# Patient Record
Sex: FEMALE | Race: WHITE | NOT HISPANIC OR LATINO | URBAN - METROPOLITAN AREA
[De-identification: names, ages, dates, MRNs, and addresses within clinical notes are randomized per-mention and may not be internally consistent; named-entity substitution may affect disease eponyms.]

---

## 2017-09-01 ENCOUNTER — EMERGENCY (EMERGENCY)
Facility: HOSPITAL | Age: 67
LOS: 0 days | Discharge: HOME | End: 2017-09-01
Attending: EMERGENCY MEDICINE | Admitting: EMERGENCY MEDICINE
Payer: MEDICARE

## 2017-09-01 VITALS
DIASTOLIC BLOOD PRESSURE: 69 MMHG | HEART RATE: 64 BPM | SYSTOLIC BLOOD PRESSURE: 179 MMHG | OXYGEN SATURATION: 100 % | RESPIRATION RATE: 17 BRPM | TEMPERATURE: 98 F

## 2017-09-01 VITALS
HEIGHT: 64 IN | WEIGHT: 104.06 LBS | TEMPERATURE: 98 F | OXYGEN SATURATION: 100 % | HEART RATE: 64 BPM | DIASTOLIC BLOOD PRESSURE: 84 MMHG | SYSTOLIC BLOOD PRESSURE: 180 MMHG | RESPIRATION RATE: 16 BRPM

## 2017-09-01 DIAGNOSIS — H53.8 OTHER VISUAL DISTURBANCES: ICD-10-CM

## 2017-09-01 DIAGNOSIS — Z86.73 PERSONAL HISTORY OF TRANSIENT ISCHEMIC ATTACK (TIA), AND CEREBRAL INFARCTION WITHOUT RESIDUAL DEFICITS: ICD-10-CM

## 2017-09-01 DIAGNOSIS — G35 MULTIPLE SCLEROSIS: ICD-10-CM

## 2017-09-01 LAB
ALBUMIN SERPL ELPH-MCNC: 3.3 G/DL — SIGNIFICANT CHANGE UP (ref 3.3–5)
ALP SERPL-CCNC: 86 U/L — SIGNIFICANT CHANGE UP (ref 40–120)
ALT FLD-CCNC: 42 U/L — SIGNIFICANT CHANGE UP (ref 12–78)
ANION GAP SERPL CALC-SCNC: 6 MMOL/L — SIGNIFICANT CHANGE UP (ref 5–17)
AST SERPL-CCNC: 20 U/L — SIGNIFICANT CHANGE UP (ref 15–37)
BASOPHILS # BLD AUTO: 0.1 K/UL — SIGNIFICANT CHANGE UP (ref 0–0.2)
BASOPHILS NFR BLD AUTO: 1.3 % — SIGNIFICANT CHANGE UP (ref 0–2)
BILIRUB SERPL-MCNC: 0.4 MG/DL — SIGNIFICANT CHANGE UP (ref 0.2–1.2)
BUN SERPL-MCNC: 29 MG/DL — HIGH (ref 7–23)
CALCIUM SERPL-MCNC: 9.1 MG/DL — SIGNIFICANT CHANGE UP (ref 8.5–10.1)
CHLORIDE SERPL-SCNC: 108 MMOL/L — SIGNIFICANT CHANGE UP (ref 96–108)
CO2 SERPL-SCNC: 29 MMOL/L — SIGNIFICANT CHANGE UP (ref 22–31)
CREAT SERPL-MCNC: 0.94 MG/DL — SIGNIFICANT CHANGE UP (ref 0.5–1.3)
EOSINOPHIL # BLD AUTO: 0.2 K/UL — SIGNIFICANT CHANGE UP (ref 0–0.5)
EOSINOPHIL NFR BLD AUTO: 2.3 % — SIGNIFICANT CHANGE UP (ref 0–6)
ERYTHROCYTE [SEDIMENTATION RATE] IN BLOOD: 13 MM/HR — SIGNIFICANT CHANGE UP (ref 0–20)
GLUCOSE SERPL-MCNC: 133 MG/DL — HIGH (ref 70–99)
HCT VFR BLD CALC: 37.1 % — SIGNIFICANT CHANGE UP (ref 34.5–45)
HGB BLD-MCNC: 12.7 G/DL — SIGNIFICANT CHANGE UP (ref 11.5–15.5)
LYMPHOCYTES # BLD AUTO: 1.4 K/UL — SIGNIFICANT CHANGE UP (ref 1–3.3)
LYMPHOCYTES # BLD AUTO: 20.9 % — SIGNIFICANT CHANGE UP (ref 13–44)
MCHC RBC-ENTMCNC: 31.1 PG — SIGNIFICANT CHANGE UP (ref 27–34)
MCHC RBC-ENTMCNC: 34.2 GM/DL — SIGNIFICANT CHANGE UP (ref 32–36)
MCV RBC AUTO: 90.8 FL — SIGNIFICANT CHANGE UP (ref 80–100)
MONOCYTES # BLD AUTO: 0.5 K/UL — SIGNIFICANT CHANGE UP (ref 0–0.9)
MONOCYTES NFR BLD AUTO: 7.5 % — SIGNIFICANT CHANGE UP (ref 2–14)
NEUTROPHILS # BLD AUTO: 4.7 K/UL — SIGNIFICANT CHANGE UP (ref 1.8–7.4)
NEUTROPHILS NFR BLD AUTO: 68 % — SIGNIFICANT CHANGE UP (ref 43–77)
PLATELET # BLD AUTO: 152 K/UL — SIGNIFICANT CHANGE UP (ref 150–400)
POTASSIUM SERPL-MCNC: 4.4 MMOL/L — SIGNIFICANT CHANGE UP (ref 3.5–5.3)
POTASSIUM SERPL-SCNC: 4.4 MMOL/L — SIGNIFICANT CHANGE UP (ref 3.5–5.3)
PROT SERPL-MCNC: 6.3 GM/DL — SIGNIFICANT CHANGE UP (ref 6–8.3)
RBC # BLD: 4.09 M/UL — SIGNIFICANT CHANGE UP (ref 3.8–5.2)
RBC # FLD: 11.5 % — SIGNIFICANT CHANGE UP (ref 10.3–14.5)
SODIUM SERPL-SCNC: 143 MMOL/L — SIGNIFICANT CHANGE UP (ref 135–145)
WBC # BLD: 6.9 K/UL — SIGNIFICANT CHANGE UP (ref 3.8–10.5)
WBC # FLD AUTO: 6.9 K/UL — SIGNIFICANT CHANGE UP (ref 3.8–10.5)

## 2017-09-01 PROCEDURE — 99285 EMERGENCY DEPT VISIT HI MDM: CPT

## 2017-09-01 PROCEDURE — 70450 CT HEAD/BRAIN W/O DYE: CPT | Mod: 26

## 2017-09-01 PROCEDURE — 93010 ELECTROCARDIOGRAM REPORT: CPT

## 2017-09-01 NOTE — ED PROVIDER NOTE - MEDICAL DECISION MAKING DETAILS
Pt with hx MS and stroke p/w flashing lights in visual field for a few minutes that have resolved, no other sx, stroke scale 0. CTH shows no acute findings. Pt understands need for otpt f/u and rted precautions

## 2017-09-01 NOTE — ED PROVIDER NOTE - PROGRESS NOTE DETAILS
Attending Mary: Bedside US of bilateral eyes showed no evidence of retinal detachment or vitreous hemorrhage. No pain with ocular motion. Attending Mary: 2 attempts made to call on-call neurology, no answer. Spoke with pt who would prefer to f/u with her neuro-ophthalmologist and MS specialist in the next few days. Strict RTED precautions were given NIHSS: 0.

## 2017-09-01 NOTE — ED PROVIDER NOTE - OBJECTIVE STATEMENT
65 y/o F hx MS and stroke p/w vision changes. Pt reports seeing flashing lights in bilat visual field that lasted a few seconds, approx 10 episodes over a few minutes today. Denies any pain, F/C/S/N/V/D. Sx have completely resolved. 67 y/o F hx MS and stroke p/w vision changes. Pt reports seeing flashing lights in bilat visual field that lasted a few seconds, approx 10 episodes over a few minutes today. Denies any pain, F/C/S/N/V/D. Sx have completely resolved. Denies other sx of weakness, numbness/tingling.

## 2017-09-01 NOTE — ED ADULT NURSE NOTE - OBJECTIVE STATEMENT
Pt alert and oriented x3. Pt presents s/p having an episode of flashing light in her vision. All symptoms have resolved. Pt denies HA loss of vision or weakness. Pt hx of stroke in 2014.

## 2017-09-01 NOTE — ED ADULT TRIAGE NOTE - CHIEF COMPLAINT QUOTE
Patient reports seeing flashing lights while watching tv, hx of MS and stroke. Speech is clear at this time patient is AAOx4, denies pain.

## 2019-05-18 ENCOUNTER — EMERGENCY (EMERGENCY)
Facility: HOSPITAL | Age: 69
LOS: 0 days | Discharge: HOME | End: 2019-05-18
Attending: EMERGENCY MEDICINE | Admitting: EMERGENCY MEDICINE
Payer: MEDICARE

## 2019-05-18 VITALS
WEIGHT: 102.96 LBS | HEIGHT: 61 IN | RESPIRATION RATE: 16 BRPM | HEART RATE: 70 BPM | TEMPERATURE: 98 F | SYSTOLIC BLOOD PRESSURE: 157 MMHG | OXYGEN SATURATION: 100 % | DIASTOLIC BLOOD PRESSURE: 75 MMHG

## 2019-05-18 DIAGNOSIS — M54.9 DORSALGIA, UNSPECIFIED: ICD-10-CM

## 2019-05-18 DIAGNOSIS — Z88.2 ALLERGY STATUS TO SULFONAMIDES: ICD-10-CM

## 2019-05-18 DIAGNOSIS — Z88.0 ALLERGY STATUS TO PENICILLIN: ICD-10-CM

## 2019-05-18 DIAGNOSIS — M54.31 SCIATICA, RIGHT SIDE: ICD-10-CM

## 2019-05-18 PROBLEM — I63.9 CEREBRAL INFARCTION, UNSPECIFIED: Chronic | Status: ACTIVE | Noted: 2017-09-01

## 2019-05-18 PROCEDURE — 99283 EMERGENCY DEPT VISIT LOW MDM: CPT

## 2019-05-18 RX ORDER — LIDOCAINE 4 G/100G
1 CREAM TOPICAL ONCE
Refills: 0 | Status: COMPLETED | OUTPATIENT
Start: 2019-05-18 | End: 2019-05-18

## 2019-05-18 RX ORDER — IBUPROFEN 200 MG
1 TABLET ORAL
Qty: 12 | Refills: 0
Start: 2019-05-18

## 2019-05-18 RX ORDER — CYCLOBENZAPRINE HYDROCHLORIDE 10 MG/1
1 TABLET, FILM COATED ORAL
Qty: 10 | Refills: 0
Start: 2019-05-18 | End: 2019-05-22

## 2019-05-18 RX ORDER — CYCLOBENZAPRINE HYDROCHLORIDE 10 MG/1
10 TABLET, FILM COATED ORAL ONCE
Refills: 0 | Status: COMPLETED | OUTPATIENT
Start: 2019-05-18 | End: 2019-05-18

## 2019-05-18 RX ORDER — IBUPROFEN 200 MG
400 TABLET ORAL ONCE
Refills: 0 | Status: COMPLETED | OUTPATIENT
Start: 2019-05-18 | End: 2019-05-18

## 2019-05-18 RX ADMIN — LIDOCAINE 1 PATCH: 4 CREAM TOPICAL at 08:44

## 2019-05-18 RX ADMIN — Medication 400 MILLIGRAM(S): at 08:44

## 2019-05-18 RX ADMIN — CYCLOBENZAPRINE HYDROCHLORIDE 10 MILLIGRAM(S): 10 TABLET, FILM COATED ORAL at 08:44

## 2019-05-18 NOTE — ED ADULT NURSE NOTE - OBJECTIVE STATEMENT
Pt presents to ED after lower back pain radiating to right buttocks and down right leg.  Pt states this began 2 days prior.  Denies injury or falls but states "Jessica just been superwomen lately".  Denies taking any medication PTA.  No neuro deficits and pr able to move b/l LE.  Denies numbness and tingling.

## 2019-05-18 NOTE — ED PROVIDER NOTE - OBJECTIVE STATEMENT
67 yo f with MS presents with left sided right gluteal pain that radiates down into her right leg. no bowel or bladder incontinence, no tingling around rectum. she states she was "doing too much" to get ready for her trip to [place] next month.  no falls or injury.

## 2019-05-18 NOTE — ED ADULT NURSE NOTE - NSIMPLEMENTINTERV_GEN_ALL_ED
Implemented All Universal Safety Interventions:  [place] to call system. Call bell, personal items and telephone within reach. Instruct patient to call for assistance. Room bathroom lighting operational. Non-slip footwear when patient is off stretcher. Physically safe environment: no spills, clutter or unnecessary equipment. Stretcher in lowest position, wheels locked, appropriate side rails in place.

## 2019-05-18 NOTE — ED ADULT TRIAGE NOTE - CHIEF COMPLAINT QUOTE
Non traumatic back pain radiating down right leg. States same this happened once before and resolved. Did not attempt to take any pain meds PTA. Denies any pain at triage, VS stable.

## 2019-05-18 NOTE — ED PROVIDER NOTE - NSFOLLOWUPCLINICS_GEN_ALL_ED_FT
[facility]  Family Medicine  [address] [place] [address]  Phone: [phone]  Fax:   Follow Up Time:

## 2019-11-08 PROBLEM — G35 MULTIPLE SCLEROSIS: Chronic | Status: ACTIVE | Noted: 2019-05-18

## 2019-12-17 ENCOUNTER — APPOINTMENT (OUTPATIENT)
Dept: NEUROLOGY | Facility: CLINIC | Age: 69
End: 2019-12-17

## 2020-02-14 ENCOUNTER — APPOINTMENT (OUTPATIENT)
Dept: NEUROLOGY | Facility: CLINIC | Age: 70
End: 2020-02-14
Payer: MEDICARE

## 2020-02-14 VITALS
SYSTOLIC BLOOD PRESSURE: 138 MMHG | DIASTOLIC BLOOD PRESSURE: 83 MMHG | HEIGHT: 61 IN | HEART RATE: 80 BPM | BODY MASS INDEX: 19.45 KG/M2 | WEIGHT: 103 LBS

## 2020-02-14 PROCEDURE — 99204 OFFICE O/P NEW MOD 45 MIN: CPT

## 2020-02-14 RX ORDER — ATORVASTATIN CALCIUM 20 MG/1
20 TABLET, FILM COATED ORAL
Refills: 0 | Status: ACTIVE | COMMUNITY

## 2020-02-14 RX ORDER — LOSARTAN POTASSIUM 25 MG/1
25 TABLET, FILM COATED ORAL
Refills: 0 | Status: ACTIVE | COMMUNITY

## 2020-02-14 NOTE — HISTORY OF PRESENT ILLNESS
[FreeTextEntry1] : 69-year-old right-handed female with history remarkable for multiple sclerosis, is currently on Avonex weekly IM injections, presents today for continuation of care as her neurologist Dr. Sharpe has moved.\par \par Patient reports that she was a MS suspect around age 26, her symptoms were soft and occurred off and on, in her mid 40s, she was diagnosed with MS by Dr. Sharpe, (MRI positive), was started on Avonex, she stayed on the injections for several years, about 5 years ago she was switched to Tecfidera, she took it for less than a year, it was discontinued as her blood work was abnormal, patient was restarted on Avonex, she got these injections well, she has no headaches, visual blurring, muscle aches, flulike symptoms prior depression. \par \par Patient reports that she is able to walk unassisted, uses a cane as needed, her balance is slightly off, she denies double vision, visual blurring, tinnitus, speech disturbance, she admits to urinary overflow incontinence has started using diapers, she also admits to having short-term memory problems, she has been on donepezil 5 mg daily is tolerating it well.\par \par Patient does not recall when she had last MRI, and records from Dr. Sharpe for over last 5 years are not available.

## 2020-02-14 NOTE — PHYSICAL EXAM
[General Appearance - Alert] : alert [General Appearance - In No Acute Distress] : in no acute distress [Oriented To Time, Place, And Person] : oriented to person, place, and time [Mood] : the mood was normal [Person] : oriented to person [Place] : oriented to place [Time] : oriented to time [Short Term Intact] : short term memory impaired [Concentration Intact] : a decrease in concentrating ability was observed [Repeating Phrases] : no difficulty repeating a phrase [Naming Objects] : no difficulty naming common objects [Visual Intact] : visual attention was ~T not ~L decreased [Comprehension] : comprehension intact [Writing A Sentence] : no difficulty writing a sentence [Fluency] : fluency intact [Reading] : reading intact [Past History] : adequate knowledge of personal past history [Cranial Nerves Optic (II)] : visual acuity intact bilaterally,  visual fields full to confrontation, pupils equal round and reactive to light [Cranial Nerves Oculomotor (III)] : extraocular motion intact [Cranial Nerves Trigeminal (V)] : facial sensation intact symmetrically [Cranial Nerves Facial (VII)] : face symmetrical [Cranial Nerves Glossopharyngeal (IX)] : tongue and palate midline [Cranial Nerves Hypoglossal (XII)] : there was no tongue deviation with protrusion [Cranial Nerves Accessory (XI - Cranial And Spinal)] : head turning and shoulder shrug symmetric [Whisper Not [place]] : whispered voice was not heard [Motor Tone] : muscle tone was normal in all four extremities [Sensation Tactile Decrease] : light touch was intact [Proprioception] : proprioception was intact [Romberg's Sign] : Romberg's sign was negtive [Vibration Decrease Leg / Foot Toes Both Feet] : decreased at the toes of both feet  [Vibration Decrease Leg / Foot Both Ankles] : decreased at both ankles [Limited Balance] : the patient's balance was impaired [Past-pointing] : there was no past-pointing [Tremor] : no tremor present [Dysdiadochokinesia Bilaterally] : not present [Coordination - Dysmetria Impaired Finger-to-Nose Left Only] : present on the left side [2+] : Ankle jerk left 2+ [Plantar Reflex Right Only] : normal on the right [Plantar Reflex Left Only] : normal on the left [FreeTextEntry6] : No pronator drift, mild weakness in the left knee extensors, other muscle groups 5/5.  [PERRL With Normal Accommodation] : pupils were equal in size, round, reactive to light, with normal accommodation [FreeTextEntry8] : Coordination: Mildly ataxic gait [Extraocular Movements] : extraocular movements were intact [No APD] : no afferent pupillary defect [Full Visual Field] : full visual field [Neck Cervical Mass (___cm)] : no neck mass was observed [Auscultation Breath Sounds / Voice Sounds] : lungs were clear to auscultation bilaterally [Heart Rate And Rhythm] : heart rate was normal and rhythm regular [Heart Sounds] : normal S1 and S2 [Arterial Pulses Carotid] : carotid pulses were normal with no bruits [Edema] : there was no peripheral edema [Involuntary Movements] : no involuntary movements were seen [] : no rash

## 2020-02-14 NOTE — REVIEW OF SYSTEMS
[Feeling Tired] : feeling tired [Poor Coordination] : poor coordination [Memory Lapses or Loss] : memory loss [Decr. Concentrating Ability] : decreased concentrating ability [Difficulties in Speech] : speech difficulties [Numbness] : numbness [Tingling] : tingling [Anxiety] : anxiety [Incontinence] : incontinence [Negative] : Endocrine

## 2020-02-14 NOTE — REASON FOR VISIT
[FreeTextEntry1] : Continuation of care for multiple sclerosis [Initial Eval - Existing Diagnosis] : an initial evaluation of an existing diagnosis

## 2020-02-14 NOTE — DISCUSSION/SUMMARY
[FreeTextEntry1] : 69-year-old female with history remarkable for multiple sclerosis, diagnosed in her 40's, has been on  Avonex weekly IM injections, was switched to Tecfidera, for a short time, discontinued as her blood work was abnormal presents for continuation of care as her neurologist Dr. Sharpe has moved.\par \par # Multiple sclerosis possibly relapsing remitting; stable with mild ataxic gait\par \par - I have recommended continuing Avonex IM injections weekly\par - Will follow up labs CBC/CMP at followup visit\par - Continue vitamin D 5000 international units daily\par - Have advised the patient to obtain records from Dr. Sharpe including last MRI reports\par \par # Mild cognitive impairment; most likely related to MS\par \par - Continue donepezil 5 mg daily, may consider increasing it in the future\par - Perform cognitive assessment in 8-10 weeks

## 2020-02-25 NOTE — ED ADULT NURSE NOTE - NS ED PATIENT SAFETY CONCERN
-- DO NOT REPLY / DO NOT REPLY ALL --  -- Message is from the [facility]--    General Patient Message      Reason for Call: Please call patient's wife back. Patient is having some health problems she has questions about.     Caller Information       Type Contact Phone    02/25/2020 10:58 AM Phone (Incoming) BELLO HYATT (Emergency Contact) [phone]          Alternative phone number: none     Turnaround time given to caller:   \"This message will be sent to [state Provider's name]. The clinical team will fulfill your request as soon as they review your message.\"}     No

## 2020-08-12 ENCOUNTER — APPOINTMENT (OUTPATIENT)
Dept: NEUROLOGY | Facility: CLINIC | Age: 70
End: 2020-08-12
Payer: MEDICARE

## 2020-08-12 VITALS
TEMPERATURE: 97.5 F | BODY MASS INDEX: 19.45 KG/M2 | HEART RATE: 82 BPM | HEIGHT: 61 IN | DIASTOLIC BLOOD PRESSURE: 74 MMHG | SYSTOLIC BLOOD PRESSURE: 120 MMHG | WEIGHT: 103 LBS

## 2020-08-12 PROCEDURE — 99214 OFFICE O/P EST MOD 30 MIN: CPT

## 2020-08-12 NOTE — DISCUSSION/SUMMARY
[FreeTextEntry1] : 69-year-old female with history remarkable for multiple sclerosis, diagnosed in her 40's, has been on  Avonex weekly IM injections, was switched to Tecfidera, for a short time, discontinued as her blood work was abnormal presents for continuation of care as her neurologist Dr. Sharpe has moved.\par \par # Multiple sclerosis possibly relapsing remitting; stable with mild ataxic gait\par \par - I have recommended continuing Avonex IM injections weekly\par - obtain labs CBC/CMP\par - Continue vitamin D 5000 international units daily\par - Have advised the patient to obtain records from Dr. Sharpe including last MRI reports\par \par # Mild cognitive impairment; most likely related to MS\par \par - Continue donepezil 5 mg daily, may consider increasing it after cognitive assessment

## 2020-08-12 NOTE — PHYSICAL EXAM
[General Appearance - In No Acute Distress] : in no acute distress [General Appearance - Alert] : alert [Oriented To Time, Place, And Person] : oriented to person, place, and time [Mood] : the mood was normal [Person] : oriented to person [Place] : oriented to place [Time] : oriented to time [Visual Intact] : visual attention was ~T not ~L decreased [Naming Objects] : no difficulty naming common objects [Repeating Phrases] : no difficulty repeating a phrase [Writing A Sentence] : no difficulty writing a sentence [Fluency] : fluency intact [Comprehension] : comprehension intact [Reading] : reading intact [Past History] : adequate knowledge of personal past history [Cranial Nerves Optic (II)] : visual acuity intact bilaterally,  visual fields full to confrontation, pupils equal round and reactive to light [Cranial Nerves Oculomotor (III)] : extraocular motion intact [Cranial Nerves Trigeminal (V)] : facial sensation intact symmetrically [Cranial Nerves Glossopharyngeal (IX)] : tongue and palate midline [Cranial Nerves Facial (VII)] : face symmetrical [Cranial Nerves Hypoglossal (XII)] : there was no tongue deviation with protrusion [Cranial Nerves Accessory (XI - Cranial And Spinal)] : head turning and shoulder shrug symmetric [Whisper Not [place]] : whispered voice was not heard [Motor Tone] : muscle tone was normal in all four extremities [Sensation Tactile Decrease] : light touch was intact [Proprioception] : proprioception was intact [Vibration Decrease Leg / Foot Both Ankles] : decreased at both ankles [Vibration Decrease Leg / Foot Toes Both Feet] : decreased at the toes of both feet  [Coordination - Dysmetria Impaired Finger-to-Nose Left Only] : present on the left side [2+] : Ankle jerk left 2+ [PERRL With Normal Accommodation] : pupils were equal in size, round, reactive to light, with normal accommodation [Extraocular Movements] : extraocular movements were intact [No APD] : no afferent pupillary defect [Full Visual Field] : full visual field [Neck Cervical Mass (___cm)] : no neck mass was observed [Auscultation Breath Sounds / Voice Sounds] : lungs were clear to auscultation bilaterally [Heart Rate And Rhythm] : heart rate was normal and rhythm regular [Heart Sounds] : normal S1 and S2 [Arterial Pulses Carotid] : carotid pulses were normal with no bruits [Edema] : there was no peripheral edema [Involuntary Movements] : no involuntary movements were seen [] : no rash [Short Term Intact] : short term memory impaired [Romberg's Sign] : Romberg's sign was negtive [Concentration Intact] : a decrease in concentrating ability was observed [Dysdiadochokinesia Bilaterally] : not present [Plantar Reflex Left Only] : normal on the left [Plantar Reflex Right Only] : normal on the right [FreeTextEntry6] : No pronator drift, mild weakness in the left knee extensors, other muscle groups 5/5.  [FreeTextEntry8] : Coordination: Mildly ataxic gait

## 2020-08-12 NOTE — REVIEW OF SYSTEMS
[Feeling Tired] : feeling tired [Memory Lapses or Loss] : memory loss [Decr. Concentrating Ability] : decreased concentrating ability [Numbness] : numbness [Difficulties in Speech] : speech difficulties [Poor Coordination] : poor coordination [Tingling] : tingling [Anxiety] : anxiety [Incontinence] : incontinence [Negative] : Endocrine

## 2020-08-12 NOTE — HISTORY OF PRESENT ILLNESS
[FreeTextEntry1] : Pt is here for followup visit today, was last seen in his initial consult on 2/14/20. Patient reports she is stable, she has not had any new motor weakness, visual blurring, muscle aches, spasms, LOC or headaches. He has mildly unstable gait, uses a cane as needed. She takes Avonex weekly injections, she reports occasionally if she misses it she does it the next day, she has not had any flulike symptoms or depression.\par \par Patient reports she continues to have mild short-term memory problems, she takes donepezil 5 mg daily, she was scheduled to have cognitive assessment in our office, due to Covid-19 related social distancing  it was canceled, she is scheduled to have it at [facility] in the next month.

## 2020-08-21 ENCOUNTER — APPOINTMENT (OUTPATIENT)
Dept: NEUROLOGY | Facility: CLINIC | Age: 70
End: 2020-08-21

## 2020-10-14 LAB
ALBUMIN SERPL ELPH-MCNC: 4.1 G/DL
ALP BLD-CCNC: 84 U/L
ALT SERPL-CCNC: 35 U/L
ANION GAP SERPL CALC-SCNC: 9 MMOL/L
AST SERPL-CCNC: 27 U/L
BASOPHILS # BLD AUTO: 0.05 K/UL
BASOPHILS NFR BLD AUTO: 1.1 %
BILIRUB SERPL-MCNC: 0.5 MG/DL
BUN SERPL-MCNC: 22 MG/DL
CALCIUM SERPL-MCNC: 8.9 MG/DL
CHLORIDE SERPL-SCNC: 103 MMOL/L
CO2 SERPL-SCNC: 31 MMOL/L
CREAT SERPL-MCNC: 0.81 MG/DL
EOSINOPHIL # BLD AUTO: 0.2 K/UL
EOSINOPHIL NFR BLD AUTO: 4.4 %
GLUCOSE SERPL-MCNC: 95 MG/DL
HCT VFR BLD CALC: 40.5 %
HGB BLD-MCNC: 13 G/DL
IMM GRANULOCYTES NFR BLD AUTO: 0.4 %
LYMPHOCYTES # BLD AUTO: 1.74 K/UL
LYMPHOCYTES NFR BLD AUTO: 38.6 %
MAN DIFF?: NORMAL
MCHC RBC-ENTMCNC: 31 PG
MCHC RBC-ENTMCNC: 32.1 GM/DL
MCV RBC AUTO: 96.4 FL
MONOCYTES # BLD AUTO: 0.41 K/UL
MONOCYTES NFR BLD AUTO: 9.1 %
NEUTROPHILS # BLD AUTO: 2.09 K/UL
NEUTROPHILS NFR BLD AUTO: 46.4 %
PLATELET # BLD AUTO: 154 K/UL
POTASSIUM SERPL-SCNC: 4.9 MMOL/L
PROT SERPL-MCNC: 6.3 G/DL
RBC # BLD: 4.2 M/UL
RBC # FLD: 11.9 %
SODIUM SERPL-SCNC: 143 MMOL/L
WBC # FLD AUTO: 4.51 K/UL

## 2020-12-07 ENCOUNTER — APPOINTMENT (OUTPATIENT)
Dept: NEUROLOGY | Facility: CLINIC | Age: 70
End: 2020-12-07
Payer: MEDICARE

## 2020-12-07 VITALS
TEMPERATURE: 97.4 F | HEIGHT: 61 IN | HEART RATE: 90 BPM | WEIGHT: 103 LBS | SYSTOLIC BLOOD PRESSURE: 150 MMHG | DIASTOLIC BLOOD PRESSURE: 84 MMHG | BODY MASS INDEX: 19.45 KG/M2

## 2020-12-07 PROCEDURE — 99214 OFFICE O/P EST MOD 30 MIN: CPT

## 2020-12-07 NOTE — DISCUSSION/SUMMARY
[FreeTextEntry1] : 70-year-old female with history remarkable for multiple sclerosis, diagnosed in her 40's, has been on  Avonex weekly IM injections, was switched to Tecfidera, for a short time, discontinued as her blood work was abnormal presents for continuation of care as her neurologist Dr. Sharpe has moved.\par \par # Multiple sclerosis possibly relapsing remitting; stable with mild ataxic gait\par \par - I have recommended continuing Avonex IM injections weekly\par - will f/u with labs CBC/CMP/vit D done at PMD's office\par - Continue vitamin D 5000 international units daily\par - Have advised the patient to obtain records from Dr. Sharpe including last MRI reports\par \par # Mild cognitive impairment; most likely related to MS\par \par - Continue donepezil , increase dose to 10 mg daily\par \par # Fatigue -most likely related to MS\par \par - Discussed the option of starting modafinil for fatigue, patient not interested\par \par

## 2020-12-07 NOTE — REVIEW OF SYSTEMS
[Feeling Tired] : feeling tired [Memory Lapses or Loss] : memory loss [Decr. Concentrating Ability] : decreased concentrating ability [Poor Coordination] : poor coordination [Difficulties in Speech] : speech difficulties [Numbness] : numbness [Tingling] : tingling [Anxiety] : anxiety [Incontinence] : incontinence [Negative] : Heme/Lymph

## 2020-12-07 NOTE — PHYSICAL EXAM
[General Appearance - Alert] : alert [General Appearance - In No Acute Distress] : in no acute distress [Oriented To Time, Place, And Person] : oriented to person, place, and time [Mood] : the mood was normal [Person] : oriented to person [Place] : oriented to place [Time] : oriented to time [Visual Intact] : visual attention was ~T not ~L decreased [Naming Objects] : no difficulty naming common objects [Repeating Phrases] : no difficulty repeating a phrase [Writing A Sentence] : no difficulty writing a sentence [Fluency] : fluency intact [Comprehension] : comprehension intact [Reading] : reading intact [Past History] : adequate knowledge of personal past history [Cranial Nerves Optic (II)] : visual acuity intact bilaterally,  visual fields full to confrontation, pupils equal round and reactive to light [Cranial Nerves Oculomotor (III)] : extraocular motion intact [Cranial Nerves Trigeminal (V)] : facial sensation intact symmetrically [Cranial Nerves Facial (VII)] : face symmetrical [Cranial Nerves Glossopharyngeal (IX)] : tongue and palate midline [Cranial Nerves Accessory (XI - Cranial And Spinal)] : head turning and shoulder shrug symmetric [Cranial Nerves Hypoglossal (XII)] : there was no tongue deviation with protrusion [Whisper Not [place]] : whispered voice was not heard [Motor Tone] : muscle tone was normal in all four extremities [Sensation Tactile Decrease] : light touch was intact [Proprioception] : proprioception was intact [Vibration Decrease Leg / Foot Both Ankles] : decreased at both ankles [Vibration Decrease Leg / Foot Toes Both Feet] : decreased at the toes of both feet  [Coordination - Dysmetria Impaired Finger-to-Nose Left Only] : present on the left side [2+] : Ankle jerk left 2+ [PERRL With Normal Accommodation] : pupils were equal in size, round, reactive to light, with normal accommodation [Extraocular Movements] : extraocular movements were intact [No APD] : no afferent pupillary defect [Full Visual Field] : full visual field [Neck Cervical Mass (___cm)] : no neck mass was observed [Auscultation Breath Sounds / Voice Sounds] : lungs were clear to auscultation bilaterally [Heart Rate And Rhythm] : heart rate was normal and rhythm regular [Heart Sounds] : normal S1 and S2 [Arterial Pulses Carotid] : carotid pulses were normal with no bruits [Edema] : there was no peripheral edema [Involuntary Movements] : no involuntary movements were seen [] : no rash [Short Term Intact] : short term memory impaired [Concentration Intact] : a decrease in concentrating ability was observed [Romberg's Sign] : Romberg's sign was negtive [Dysdiadochokinesia Bilaterally] : not present [Plantar Reflex Right Only] : normal on the right [Plantar Reflex Left Only] : normal on the left [FreeTextEntry6] : No pronator drift, mild weakness in the left knee extensors, other muscle groups 5/5.  [FreeTextEntry8] : Coordination: Mildly ataxic gait

## 2020-12-07 NOTE — HISTORY OF PRESENT ILLNESS
[FreeTextEntry1] : Pt is here for followup visit today, was last seen on 12/18/20. Patient reports she is stable, she has not had any new motor weakness, visual blurring, muscle aches, spasms, LOC or headaches. He has mildly unstable gait, uses a cane as needed. She takes Avonex weekly injections, she has not had any flulike symptoms or depression. Pt has had labs with Dr. Rebollar, she reports vitamin D does level was normal, I have not received results. Pt does complain of  intermittent fatigue.\par \par Patient reports she continues to have mild short-term memory problems, she takes donepezil 5 mg daily, she was scheduled to have cognitive assessment at [facility], She reports she is not interested in doing it, as it's not going to

## 2021-02-08 ENCOUNTER — APPOINTMENT (OUTPATIENT)
Dept: NEUROLOGY | Facility: CLINIC | Age: 71
End: 2021-02-08

## 2021-02-22 ENCOUNTER — APPOINTMENT (OUTPATIENT)
Dept: NEUROLOGY | Facility: CLINIC | Age: 71
End: 2021-02-22

## 2021-02-28 ENCOUNTER — RX RENEWAL (OUTPATIENT)
Age: 71
End: 2021-02-28

## 2021-03-15 ENCOUNTER — APPOINTMENT (OUTPATIENT)
Dept: NEUROLOGY | Facility: CLINIC | Age: 71
End: 2021-03-15
Payer: MEDICARE

## 2021-03-15 VITALS
WEIGHT: 102 LBS | BODY MASS INDEX: 19.26 KG/M2 | HEART RATE: 71 BPM | TEMPERATURE: 97.2 F | HEIGHT: 61 IN | SYSTOLIC BLOOD PRESSURE: 142 MMHG | DIASTOLIC BLOOD PRESSURE: 76 MMHG

## 2021-03-15 PROCEDURE — 99214 OFFICE O/P EST MOD 30 MIN: CPT

## 2021-03-15 NOTE — PHYSICAL EXAM
[General Appearance - Alert] : alert [General Appearance - In No Acute Distress] : in no acute distress [Oriented To Time, Place, And Person] : oriented to person, place, and time [Mood] : the mood was normal [Person] : oriented to person [Place] : oriented to place [Time] : oriented to time [Visual Intact] : visual attention was ~T not ~L decreased [Naming Objects] : no difficulty naming common objects [Repeating Phrases] : no difficulty repeating a phrase [Writing A Sentence] : no difficulty writing a sentence [Fluency] : fluency intact [Comprehension] : comprehension intact [Reading] : reading intact [Past History] : adequate knowledge of personal past history [Cranial Nerves Optic (II)] : visual acuity intact bilaterally,  visual fields full to confrontation, pupils equal round and reactive to light [Cranial Nerves Oculomotor (III)] : extraocular motion intact [Cranial Nerves Trigeminal (V)] : facial sensation intact symmetrically [Cranial Nerves Facial (VII)] : face symmetrical [Cranial Nerves Glossopharyngeal (IX)] : tongue and palate midline [Cranial Nerves Accessory (XI - Cranial And Spinal)] : head turning and shoulder shrug symmetric [Cranial Nerves Hypoglossal (XII)] : there was no tongue deviation with protrusion [Whisper Not [place]] : whispered voice was not heard [Motor Tone] : muscle tone was normal in all four extremities [Sensation Tactile Decrease] : light touch was intact [Proprioception] : proprioception was intact [Vibration Decrease Leg / Foot Both Ankles] : decreased at both ankles [Vibration Decrease Leg / Foot Toes Both Feet] : decreased at the toes of both feet  [Coordination - Dysmetria Impaired Finger-to-Nose Left Only] : present on the left side [2+] : Ankle jerk left 2+ [PERRL With Normal Accommodation] : pupils were equal in size, round, reactive to light, with normal accommodation [Extraocular Movements] : extraocular movements were intact [No APD] : no afferent pupillary defect [Full Visual Field] : full visual field [] : the neck was supple [Neck Cervical Mass (___cm)] : no neck mass was observed [Short Term Intact] : short term memory impaired [Concentration Intact] : a decrease in concentrating ability was observed [Romberg's Sign] : Romberg's sign was negtive [Dysdiadochokinesia Bilaterally] : not present [Plantar Reflex Right Only] : normal on the right [Plantar Reflex Left Only] : normal on the left [FreeTextEntry6] : No pronator drift, mild weakness in the left knee extensors, other muscle groups 5/5.  [FreeTextEntry8] : Coordination: Mildly ataxic gait

## 2021-03-15 NOTE — HISTORY OF PRESENT ILLNESS
[FreeTextEntry1] : Pt is here for followup visit today, was last seen on 12/7/20. Patient reports she feels tired, fatigued, in the afternoon, is otherwise stable, she has not had any new motor weakness, visual blurring, muscle aches, spasms, LOC or headaches but has ulcers in the mouth, has seen her dentist. \par \par She has mildly unstable gait, no change - uses a cane as needed. She takes Avonex weekly injections, she has not had any flulike symptoms or depression. \par \par Patient reports she continues to have mild short-term memory problems, she takes donepezil 5 mg daily, she tried 10 mgs, could not tolerate higher dose. \par \par Pt was scheduled to have cognitive assessment at [facility], she is not interested in doing it, as it's not going to

## 2021-03-15 NOTE — DISCUSSION/SUMMARY
[FreeTextEntry1] : 70-year-old female with history remarkable for multiple sclerosis, diagnosed in her 40's, has been on  Avonex weekly IM injections, was switched to Tecfidera, for a short time, discontinued as her blood work was abnormal presents for continuation of care as her neurologist Dr. Sharpe has moved.\par \par # Multiple sclerosis possibly relapsing remitting; stable with mild ataxic gait\par \par - I have recommended continuing Avonex IM injections weekly\par - will f/u with labs CBC/CMP/vit D, B12 level\par - Continue vitamin D 5000 international units daily\par - Have advised the patient to obtain records from Dr. Sharpe including last MRI reports\par \par # Mild cognitive impairment; most likely related to MS\par \par - Continue donepezil 5 mg daily, could not tolerate higher dose\par \par # Fatigue -most likely related to MS\par \par - Discussed the option of starting modafinil for fatigue, patient not interested now\par \par

## 2021-03-18 LAB
25(OH)D3 SERPL-MCNC: 160 NG/ML
ALBUMIN SERPL ELPH-MCNC: 4.1 G/DL
ALP BLD-CCNC: 75 U/L
ALT SERPL-CCNC: 35 U/L
AST SERPL-CCNC: 31 U/L
BASOPHILS # BLD AUTO: 0.06 K/UL
BASOPHILS NFR BLD AUTO: 1.1 %
BILIRUB DIRECT SERPL-MCNC: 0.1 MG/DL
BILIRUB INDIRECT SERPL-MCNC: 0.4 MG/DL
BILIRUB SERPL-MCNC: 0.6 MG/DL
EOSINOPHIL # BLD AUTO: 0.23 K/UL
EOSINOPHIL NFR BLD AUTO: 4.3 %
HCT VFR BLD CALC: 40.7 %
HGB BLD-MCNC: 13.1 G/DL
IMM GRANULOCYTES NFR BLD AUTO: 0.4 %
LYMPHOCYTES # BLD AUTO: 2.13 K/UL
LYMPHOCYTES NFR BLD AUTO: 39.5 %
MAN DIFF?: NORMAL
MCHC RBC-ENTMCNC: 30.8 PG
MCHC RBC-ENTMCNC: 32.2 GM/DL
MCV RBC AUTO: 95.8 FL
MONOCYTES # BLD AUTO: 0.45 K/UL
MONOCYTES NFR BLD AUTO: 8.3 %
NEUTROPHILS # BLD AUTO: 2.5 K/UL
NEUTROPHILS NFR BLD AUTO: 46.4 %
PLATELET # BLD AUTO: 153 K/UL
PROT SERPL-MCNC: 6.5 G/DL
RBC # BLD: 4.25 M/UL
RBC # FLD: 11.9 %
VIT B12 SERPL-MCNC: 489 PG/ML
WBC # FLD AUTO: 5.39 K/UL

## 2021-04-23 ENCOUNTER — APPOINTMENT (OUTPATIENT)
Dept: NEUROLOGY | Facility: CLINIC | Age: 71
End: 2021-04-23
Payer: MEDICARE

## 2021-04-23 VITALS
WEIGHT: 103 LBS | HEIGHT: 61 IN | TEMPERATURE: 97.3 F | DIASTOLIC BLOOD PRESSURE: 84 MMHG | HEART RATE: 64 BPM | BODY MASS INDEX: 19.45 KG/M2 | SYSTOLIC BLOOD PRESSURE: 152 MMHG

## 2021-04-23 PROCEDURE — 99214 OFFICE O/P EST MOD 30 MIN: CPT

## 2021-04-23 NOTE — REASON FOR VISIT
[Follow-Up: _____] : a [unfilled] follow-up visit [Spouse] : spouse [FreeTextEntry1] : for MS / cognitive changes and fatigue

## 2021-04-23 NOTE — DATA REVIEWED
[No studies available for review at this time.] : No studies available for review at this time. [de-identified] : 3/18/21: vitamin D 160, B12 489, CBC and LFT's are normal\par

## 2021-04-23 NOTE — HISTORY OF PRESENT ILLNESS
[FreeTextEntry1] : Pt is here for followup visit today, was last seen on 3/18/20. Patient It was a complaint by her  today, she reports she has increased the dose of venlafaxine to 37.5 mgs daily alternating with 37.5 mg 2 pills every other day since 2 weeks, since then she reports she feels better, her  endorses that she is less fatigued and more alert, she has not had any new motor weakness, visual blurring, muscle aches, spasms, LOC or headaches. \par \par She has mildly unstable gait, she uses a cane as needed. She gets Avonex weekly injections, last labs done  showed CBC, LFTs and B12 levels in normal range, vitamin D level was noted to be 160, patient was notified, she reduced vitamin D intake from 10,000 international units to 5000 international units daily.\par \par Patient reports she continues to have mild short-term memory problems, she reduced donepezil to 5 mg daily, she tried 10 mgs, could not tolerate higher dose. \par \par

## 2021-04-23 NOTE — DISCUSSION/SUMMARY
[FreeTextEntry1] : 70-year-old female with PMHx of multiple sclerosis, diagnosed in her 40's, has been on  Avonex weekly IM injections, was switched to Tecfidera, for a short time, discontinued as her blood work was abnormal was under care of neurologist Dr. Sharpe previously.\par \par # Multiple sclerosis possibly relapsing remitting; stable with mild ataxic gait\par \par - I have recommended continuing Avonex IM injections weekly\par \par # High serum vit D level\par \par - Reduce vitamin D 5000 international units daily\par - F/U leveld 2 months\par \par # Mild cognitive impairment; most likely related to MS\par \par - Continue donepezil 5 mg daily, could not tolerate higher dose\par \par # Fatigue -most likely related to MS\par \par - Increase Venlafaxine 37.5 QD, alternate with 37.5 mg 2 pills QOD

## 2021-04-23 NOTE — PHYSICAL EXAM
[General Appearance - Alert] : alert [General Appearance - In No Acute Distress] : in no acute distress [Oriented To Time, Place, And Person] : oriented to person, place, and time [Mood] : the mood was normal [Person] : oriented to person [Place] : oriented to place [Time] : oriented to time [Naming Objects] : no difficulty naming common objects [Repeating Phrases] : no difficulty repeating a phrase [Fluency] : fluency intact [Cranial Nerves Optic (II)] : visual acuity intact bilaterally,  visual fields full to confrontation, pupils equal round and reactive to light [Cranial Nerves Oculomotor (III)] : extraocular motion intact [Cranial Nerves Trigeminal (V)] : facial sensation intact symmetrically [Cranial Nerves Facial (VII)] : face symmetrical [Cranial Nerves Glossopharyngeal (IX)] : tongue and palate midline [Cranial Nerves Accessory (XI - Cranial And Spinal)] : head turning and shoulder shrug symmetric [Cranial Nerves Hypoglossal (XII)] : there was no tongue deviation with protrusion [Whisper Not [place]] : whispered voice was not heard [Motor Tone] : muscle tone was normal in all four extremities [Sensation Tactile Decrease] : light touch was intact [Vibration Decrease Leg / Foot Both Ankles] : decreased at both ankles [Vibration Decrease Leg / Foot Toes Both Feet] : decreased at the toes of both feet  [Coordination - Dysmetria Impaired Finger-to-Nose Left Only] : present on the left side [2+] : Ankle jerk left 2+ [PERRL With Normal Accommodation] : pupils were equal in size, round, reactive to light, with normal accommodation [Extraocular Movements] : extraocular movements were intact [No APD] : no afferent pupillary defect [Full Visual Field] : full visual field [Neck Cervical Mass (___cm)] : no neck mass was observed [] : the neck was supple [Short Term Intact] : short term memory impaired [Registration Intact] : recent registration memory intact [Concentration Intact] : a decrease in concentrating ability was observed [Comprehension] : comprehension intact [Romberg's Sign] : Romberg's sign was negtive [Dysdiadochokinesia Bilaterally] : not present [Plantar Reflex Right Only] : normal on the right [Plantar Reflex Left Only] : normal on the left [FreeTextEntry6] : No pronator drift, mild weakness in the left knee extensors, other muscle groups 5/5.  [FreeTextEntry8] : Coordination: Mildly ataxic gait

## 2021-06-01 ENCOUNTER — APPOINTMENT (OUTPATIENT)
Dept: NEUROLOGY | Facility: CLINIC | Age: 71
End: 2021-06-01
Payer: MEDICARE

## 2021-06-01 VITALS
BODY MASS INDEX: 19.45 KG/M2 | TEMPERATURE: 97.3 F | SYSTOLIC BLOOD PRESSURE: 137 MMHG | HEIGHT: 61 IN | WEIGHT: 103 LBS | HEART RATE: 84 BPM | DIASTOLIC BLOOD PRESSURE: 76 MMHG

## 2021-06-01 PROCEDURE — 99214 OFFICE O/P EST MOD 30 MIN: CPT

## 2021-06-01 NOTE — DISCUSSION/SUMMARY
[FreeTextEntry1] : 70-year-old female with PMHx of multiple sclerosis, diagnosed in her 40's, has been on  Avonex weekly IM injections, was switched to Tecfidera, for a short time, discontinued as her blood work was abnormal, was under care of neurologist Dr. Sharpe previously.\par \par # Multiple sclerosis possibly relapsing remitting; stable with mild ataxic gait\par \par - I have recommended continuing Avonex IM injections weekly\par \par # New onset dizziness / vertigo; most Likely positional, Started after increasing Effexor dose\par \par - I have recommended obtaining MRI of the brain\par \par # Mild cognitive impairment; most likely related to MS\par \par - Continue donepezil 5 mg daily, could not tolerate higher dose\par \par # Fatigue -most likely related to MS\par \par - continue Venlafaxine 37.5 QD

## 2021-06-01 NOTE — DATA REVIEWED
[No studies available for review at this time.] : No studies available for review at this time. [de-identified] : 3/18/21: vitamin D 160, B12 489, CBC and LFT's are normal\par

## 2021-06-01 NOTE — REVIEW OF SYSTEMS
[Feeling Tired] : feeling tired [Memory Lapses or Loss] : memory loss [Decr. Concentrating Ability] : decreased concentrating ability [Poor Coordination] : poor coordination [Difficulties in Speech] : speech difficulties [Numbness] : numbness [Tingling] : tingling [Anxiety] : anxiety [Incontinence] : incontinence [Negative] : Heme/Lymph [Dizziness] : dizziness [As Noted in HPI] : as noted in HPI

## 2021-06-01 NOTE — REASON FOR VISIT
[Follow-Up: _____] : a [unfilled] follow-up visit [Spouse] : spouse [FreeTextEntry1] : for Dizziness / MS /  and fatigue

## 2021-06-01 NOTE — HISTORY OF PRESENT ILLNESS
[FreeTextEntry1] : Pt is here for followup visit today, was last seen on 4/23/21. Patient is accompanied by her . Patient reports that she continues to have dizziness, usually it occurs when she wakes up in the morning from her bed, once she is up and about she has no dizziness, she also reports mild dizziness when she turns in bed at night. Patient reports that these symptoms started after she increased venlafaxine does took 2 pills, she has lowered back the dose to one pill daily, symptoms have not resolved fully yet.\par \par Patient also reports that she is very sad and wants to cry, has no reason, she reports she did not get her Avonex injections x 1 week as it went to wrong pharmacy.\par \par Pt is less fatigued, as not had any new motor weakness, visual blurring, muscle aches, spasms, LOC or headaches. Patient reports she continues to have mild short-term memory problems, she reduced donepezil to 5 mg daily, she tried 10 mgs, could not tolerate higher dose. \par \par

## 2021-06-01 NOTE — PHYSICAL EXAM
[General Appearance - Alert] : alert [General Appearance - In No Acute Distress] : in no acute distress [Oriented To Time, Place, And Person] : oriented to person, place, and time [Mood] : the mood was normal [Person] : oriented to person [Place] : oriented to place [Time] : oriented to time [Registration Intact] : recent registration memory intact [Naming Objects] : no difficulty naming common objects [Repeating Phrases] : no difficulty repeating a phrase [Fluency] : fluency intact [Comprehension] : comprehension intact [Cranial Nerves Optic (II)] : visual acuity intact bilaterally,  visual fields full to confrontation, pupils equal round and reactive to light [Cranial Nerves Oculomotor (III)] : extraocular motion intact [Cranial Nerves Trigeminal (V)] : facial sensation intact symmetrically [Cranial Nerves Facial (VII)] : face symmetrical [Cranial Nerves Glossopharyngeal (IX)] : tongue and palate midline [Cranial Nerves Accessory (XI - Cranial And Spinal)] : head turning and shoulder shrug symmetric [Cranial Nerves Hypoglossal (XII)] : there was no tongue deviation with protrusion [Whisper Not [place]] : whispered voice was not heard [Motor Tone] : muscle tone was normal in all four extremities [Sensation Tactile Decrease] : light touch was intact [Coordination - Dysmetria Impaired Finger-to-Nose Left Only] : present on the left side [2+] : Ankle jerk left 2+ [PERRL With Normal Accommodation] : pupils were equal in size, round, reactive to light, with normal accommodation [Extraocular Movements] : extraocular movements were intact [No APD] : no afferent pupillary defect [Full Visual Field] : full visual field [] : the neck was supple [Neck Cervical Mass (___cm)] : no neck mass was observed [Short Term Intact] : short term memory impaired [Concentration Intact] : a decrease in concentrating ability was observed [Romberg's Sign] : Romberg's sign was negtive [Dysdiadochokinesia Bilaterally] : not present [Plantar Reflex Right Only] : normal on the right [Plantar Reflex Left Only] : normal on the left [FreeTextEntry6] : No pronator drift, mild weakness in the left knee extensors, other muscle groups 5/5.  [FreeTextEntry8] : Coordination: Mildly ataxic gait

## 2021-06-14 ENCOUNTER — NON-APPOINTMENT (OUTPATIENT)
Age: 71
End: 2021-06-14

## 2021-08-16 ENCOUNTER — APPOINTMENT (OUTPATIENT)
Dept: NEUROLOGY | Facility: CLINIC | Age: 71
End: 2021-08-16
Payer: MEDICARE

## 2021-08-16 VITALS
DIASTOLIC BLOOD PRESSURE: 70 MMHG | HEART RATE: 72 BPM | HEIGHT: 61 IN | WEIGHT: 103 LBS | TEMPERATURE: 97.8 F | BODY MASS INDEX: 19.45 KG/M2 | SYSTOLIC BLOOD PRESSURE: 124 MMHG

## 2021-08-16 DIAGNOSIS — Z86.73 PERSONAL HISTORY OF TRANSIENT ISCHEMIC ATTACK (TIA), AND CEREBRAL INFARCTION W/OUT RESIDUAL DEFICITS: ICD-10-CM

## 2021-08-16 PROCEDURE — 99214 OFFICE O/P EST MOD 30 MIN: CPT

## 2021-08-16 NOTE — REVIEW OF SYSTEMS
[Feeling Tired] : feeling tired [Memory Lapses or Loss] : memory loss [Decr. Concentrating Ability] : decreased concentrating ability [Poor Coordination] : poor coordination [Difficulties in Speech] : speech difficulties [Numbness] : numbness [Tingling] : tingling [Dizziness] : dizziness [As Noted in HPI] : as noted in HPI [Anxiety] : anxiety [Incontinence] : incontinence [Negative] : Heme/Lymph

## 2021-08-16 NOTE — DATA REVIEWED
[No studies available for review at this time.] : No studies available for review at this time. [de-identified] : 6/7/21: MRI brain reveals diffuse leukoencephalopathy consistent with advanced MS.. There is focal abnormal signal in the left putamen suspicious for a remote lacunar type infarct, unchanged since the prior exam of 9/12/2017. Two more small foci one in anika and other in right cerebellar hemispheric consistent with remote blood products from CVA's, new since the most recent prior MRI. No acute infarcts. \par  [de-identified] : 3/18/21: vitamin D 160, B12 489, CBC and LFT's are normal\par

## 2021-08-16 NOTE — PHYSICAL EXAM
[General Appearance - In No Acute Distress] : in no acute distress [General Appearance - Alert] : alert [Oriented To Time, Place, And Person] : oriented to person, place, and time [Mood] : the mood was normal [Person] : oriented to person [Place] : oriented to place [Time] : oriented to time [Registration Intact] : recent registration memory intact [Naming Objects] : no difficulty naming common objects [Repeating Phrases] : no difficulty repeating a phrase [Fluency] : fluency intact [Comprehension] : comprehension intact [Cranial Nerves Optic (II)] : visual acuity intact bilaterally,  visual fields full to confrontation, pupils equal round and reactive to light [Cranial Nerves Oculomotor (III)] : extraocular motion intact [Cranial Nerves Trigeminal (V)] : facial sensation intact symmetrically [Cranial Nerves Facial (VII)] : face symmetrical [Cranial Nerves Glossopharyngeal (IX)] : tongue and palate midline [Cranial Nerves Accessory (XI - Cranial And Spinal)] : head turning and shoulder shrug symmetric [Cranial Nerves Hypoglossal (XII)] : there was no tongue deviation with protrusion [Whisper Not [place]] : whispered voice was not heard [Motor Tone] : muscle tone was normal in all four extremities [Sensation Tactile Decrease] : light touch was intact [Coordination - Dysmetria Impaired Finger-to-Nose Left Only] : present on the left side [2+] : Ankle jerk left 2+ [PERRL With Normal Accommodation] : pupils were equal in size, round, reactive to light, with normal accommodation [Extraocular Movements] : extraocular movements were intact [No APD] : no afferent pupillary defect [Full Visual Field] : full visual field [] : the neck was supple [Neck Cervical Mass (___cm)] : no neck mass was observed [Short Term Intact] : short term memory impaired [Concentration Intact] : a decrease in concentrating ability was observed [Romberg's Sign] : Romberg's sign was negtive [Dysdiadochokinesia Bilaterally] : not present [Plantar Reflex Right Only] : normal on the right [Plantar Reflex Left Only] : normal on the left [FreeTextEntry6] : No pronator drift, mild weakness in the left knee extensors, other muscle groups 5/5.  [FreeTextEntry8] : Coordination: Mildly ataxic gait

## 2021-08-16 NOTE — DISCUSSION/SUMMARY
[FreeTextEntry1] : 70-year-old female with PMHx of multiple sclerosis, diagnosed in her 40's, has been on  Avonex weekly IM injections, was switched to Tecfidera, for a short time, discontinued as her blood work was abnormal, was under care of neurologist Dr. Sharpe previously.\par \par # Multiple sclerosis possibly relapsing remitting; stable with mild ataxic gait, MRI brain reveals no new enhancing lesions\par \par - I have recommended continuing Avonex IM injections weekly\par \par # New onset dizziness / vertigo; most Likely positional, resolved\par \par # Mild cognitive impairment; most likely related to MS\par \par - Continue donepezil 5 mg daily, could not tolerate higher dose\par \par # Fatigue/anxiety -most likely related to MS\par \par - continue Venlafaxine 37.5 QD\par \par # MRI brain  reveals old lacunar infarcts\par \par - continue BASA and low dose atorvastatin

## 2021-08-16 NOTE — HISTORY OF PRESENT ILLNESS
[FreeTextEntry1] : Pt is here for follow up visit today, last seen on 6/1/21. Patient reports she is feeling well, has no new complaints, she underwent left eye cataract surgery, Has slight discharge but has recovered well. She reports she is taking venlafaxine 37.5 mg daily, could not take higher dose, she reports she feels less fatigued and anxious with this current dose.. Patient also takes donepezil 5 mg daily,  she could not tolerate 10 mg. Patient's  reports  that she sleeps a lot, patient reports she only sleeps and often wounds and she denies feeling fatigued.\par Patient is active, she swims and has a .\par \par MRI of the brain reveals diffuse leukoencephalopathy consistent with advanced MS.. There is focal abnormal signal in the left putamen suspicious for a remote lacunar type infarct, unchanged since the prior exam of 9/12/2017. Two more small foci one in anika and other in right cerebellar hemispheric consistent with remote blood products from CVA's, new since the most recent prior MRI. No acute infarcts. \par

## 2022-01-06 ENCOUNTER — APPOINTMENT (OUTPATIENT)
Dept: NEUROLOGY | Facility: CLINIC | Age: 72
End: 2022-01-06
Payer: MEDICARE

## 2022-01-06 DIAGNOSIS — F32.A DEPRESSION, UNSPECIFIED: ICD-10-CM

## 2022-01-06 PROCEDURE — 99213 OFFICE O/P EST LOW 20 MIN: CPT | Mod: 95

## 2022-01-06 NOTE — HISTORY OF PRESENT ILLNESS
[Home] : at home, [unfilled] , at the time of the visit. [Medical Office: ([facility])___] : at the medical office located in  [Verbal consent obtained from patient] : the patient, [unfilled] [FreeTextEntry1] : Patient for follow-up visit, last seen on 8/16/2021.  Patient continues to have intermittent anxiety and fatigue, she is taking venlafaxine 3 7.5 mg daily, tolerating it well, she was started on Tremlow, new injection supplement (contains Vit D 3000 IU), has been advised to have extra magnesium and calcium as well, patient is concerned whether she should continue vitamin D 5000 IU daily.  Patient reports occasional twitch after starting this new injection, she has no other complaints\par \par Patient continues to take Avonex weekly injections, has had no adverse effects, is tolerating it well, in addition she takes donepezil 5 mg daily, could not tolerate higher dose.\par  11-Sep-2018 16:55

## 2022-01-06 NOTE — REVIEW OF SYSTEMS
1. What PRN did patient receive? Atarax/Vistaril    2. What was the patient doing that led to the PRN medication? Anxiety    3. Did they require R/S? NO    4. Side effects to PRN medication? None    5. After 1 Hour, patient appeared: Calm       [Feeling Tired] : feeling tired [Memory Lapses or Loss] : memory loss [Decr. Concentrating Ability] : decreased concentrating ability [Poor Coordination] : poor coordination [Difficulties in Speech] : speech difficulties [Numbness] : numbness [Tingling] : tingling [Dizziness] : dizziness [As Noted in HPI] : as noted in HPI [Anxiety] : anxiety [Incontinence] : incontinence [Negative] : Heme/Lymph

## 2022-01-06 NOTE — PHYSICAL EXAM
[General Appearance - Alert] : alert [General Appearance - In No Acute Distress] : in no acute distress [Oriented To Time, Place, And Person] : oriented to person, place, and time [Mood] : the mood was normal [Person] : oriented to person [Place] : oriented to place [Time] : oriented to time [Registration Intact] : recent registration memory intact [Naming Objects] : no difficulty naming common objects [Repeating Phrases] : no difficulty repeating a phrase [Fluency] : fluency intact [Comprehension] : comprehension intact [Cranial Nerves Optic (II)] : visual acuity intact bilaterally,  visual fields full to confrontation, pupils equal round and reactive to light [Cranial Nerves Oculomotor (III)] : extraocular motion intact [Cranial Nerves Facial (VII)] : face symmetrical [Cranial Nerves Accessory (XI - Cranial And Spinal)] : head turning and shoulder shrug symmetric [Cranial Nerves Hypoglossal (XII)] : there was no tongue deviation with protrusion [PERRL With Normal Accommodation] : pupils were equal in size, round, reactive to light, with normal accommodation [Extraocular Movements] : extraocular movements were intact [No APD] : no afferent pupillary defect [Full Visual Field] : full visual field [Short Term Intact] : short term memory impaired [Concentration Intact] : a decrease in concentrating ability was observed [FreeTextEntry5] : Right ear hearing impaired [FreeTextEntry6] : Limited virtual motor exam

## 2022-01-06 NOTE — DISCUSSION/SUMMARY
[FreeTextEntry1] : 71-year-old female with PMHx of multiple sclerosis, diagnosed in her 40's, has been on  Avonex weekly IM injections, was switched to Tecfidera, for a short time, discontinued as her blood work was abnormal, was under care of neurologist Dr. Sharpe previously.\par \par # Multiple sclerosis possibly relapsing remitting; stable with mild ataxic gait, MRI brain reveals no new enhancing lesions\par \par - I have recommended continuing Avonex IM injections weekly\par -Continue vitamin D 2000 IU daily in addition to new Injection Tremlow -contains vitamin D 3000 IU\par \par # New onset dizziness / vertigo; most Likely positional, resolved\par \par # Mild cognitive impairment; most likely related to MS\par \par - Continue donepezil 5 mg daily, could not tolerate higher dose\par \par # Fatigue/anxiety -most likely related to MS\par \par - continue Venlafaxine 37.5 QD\par \par # MRI brain  reveals old lacunar infarcts\par \par - continue BASA and low dose atorvastatin

## 2022-01-06 NOTE — DATA REVIEWED
[No studies available for review at this time.] : No studies available for review at this time. [de-identified] : 6/7/21: MRI brain reveals diffuse leukoencephalopathy consistent with advanced MS.. There is focal abnormal signal in the left putamen suspicious for a remote lacunar type infarct, unchanged since the prior exam of 9/12/2017. Two more small foci one in anika and other in right cerebellar hemispheric consistent with remote blood products from CVA's, new since the most recent prior MRI. No acute infarcts. \par  [de-identified] : 3/18/21: vitamin D 160, B12 489, CBC and LFT's are normal\par

## 2022-01-27 ENCOUNTER — RESULT REVIEW (OUTPATIENT)
Age: 72
End: 2022-01-27

## 2022-01-27 ENCOUNTER — APPOINTMENT (OUTPATIENT)
Dept: RHEUMATOLOGY | Facility: CLINIC | Age: 72
End: 2022-01-27
Payer: MEDICARE

## 2022-01-27 VITALS
HEIGHT: 61 IN | SYSTOLIC BLOOD PRESSURE: 130 MMHG | TEMPERATURE: 97.1 F | BODY MASS INDEX: 19.45 KG/M2 | OXYGEN SATURATION: 95 % | DIASTOLIC BLOOD PRESSURE: 70 MMHG | WEIGHT: 103 LBS | HEART RATE: 71 BPM

## 2022-01-27 DIAGNOSIS — M54.50 LOW BACK PAIN, UNSPECIFIED: ICD-10-CM

## 2022-01-27 DIAGNOSIS — Z78.9 OTHER SPECIFIED HEALTH STATUS: ICD-10-CM

## 2022-01-27 PROCEDURE — 36415 COLL VENOUS BLD VENIPUNCTURE: CPT

## 2022-01-27 PROCEDURE — 99205 OFFICE O/P NEW HI 60 MIN: CPT | Mod: 25

## 2022-01-27 RX ORDER — ALENDRONATE SODIUM 35 MG/1
TABLET ORAL
Refills: 0 | Status: DISCONTINUED | COMMUNITY
End: 2022-01-27

## 2022-01-27 NOTE — REASON FOR VISIT
[Consultation] : a consultation visit [FreeTextEntry1] : hx of Osteoporosis; hx of raynaud's; labs/meds

## 2022-01-27 NOTE — ASSESSMENT
[FreeTextEntry1] : 71-year-old female, here for the first time w/ hx of MS w/ Neuro; OA hands, reports of Raynaud's symptoms of feet turning purple in the cold to r/o CTD. \par She reports she also has Osteoporosis and was told by Rheum, Dr. Justice to take tymlos injections that she did for 3 weeks and had tremors and refuses it and would like to discuss different treatment options. \par -No synovitis or effusion on exam noted today and advised to monitor.\par \par OA hands: noted to have heberden nodes at DIPs with PIP hypertrophy without pain at this time and will monitor.\par \par Raynaud's: no ulcers and reports of color changes to purple in the feet w/ the cold. Discussed to keep hands warm and if not controlled can add Ca channel blocker if BP allows\par -will check serologies as below to r/o CTD to be complete \par \par LBP: intermittent \par -Referred for xray of LS spine to evaluate for structural changes, DDD, confirm SI normal \par -Advil PRN w/ food needed sparingly helps\par \par Osteoporosis: reviewed Dexa 9/1/2021 from [facility] w/ lowest t-sore -3.6 at left femoral neck and normal at AP spineL1-L4.\par -denies any hx of fractures; unsure if FH of osteoporosis; non-smoker \par -she refuses tymlos and defers forteo to avoid daily injections for 2yrs\par -states she was on alendronate few yrs ago but forgets to take it so defers it \par -Risks and benefits of prolia use were d/w patient including but not limited to myalgias, flu like symptoms, atypical fractures, infections, avascular necrosis of the jaw and hypocalcemia with handout given to read to have on f/u\par -she would like msg snet to her Neuro, Dr. Macias to confirm ok to be on prolia w/ her MS and discussed should be safe and sent msg to confirm  \par -aside from being postmenopausal put in to check for secondary causes of osteoporosis incld TSH, PTH, vitD\par -encouraged Ca +vitD supplementation \par -encouraged weight bearing exercises as tolerated.\par \par -educated on symptoms to monitor for in detail and alert us if any concerns.\par -knows to stay up to date on health maintenance w/ PCP\par -f/u in 10-14 days w/ labs, xray for prolia please\par

## 2022-01-27 NOTE — HISTORY OF PRESENT ILLNESS
[FreeTextEntry1] : 71-year-old female here for the first time w/ her , Hari. Patient states that she is history of MS with neurologist Dr. Macias.\par Patient states she has history of osteoporosis for the past few years. Patient states in the past was treated with alendronate which was stopped because she kept forgetting to take pills.\par States she saw Dr. Cheung at [facility], Dr. Justice and had DEXA September 1, 2021 that was showing worsening of her osteoporosis so she was told to take Tymlos injections daily. Patient states she tried taking Tymlos for about 3 weeks but she kept getting tremors and shaking so she stopped it and refuses it.\par Patient defers Forteo injections at this time as well.\par States would be willing to do Prolia if her neurologist is okay with it w/ her history of MS. \par Patient denies any history of fractures as far.\par Patient states she is unsure if family history of osteoporosis.\par States she was never a smoker.\par States she knows to take vitamin D 5000 once a day and calcium 600 b.i.d.\par She is noted to have some Heberden nodes of her DIPs and denies any pain in the joints.\par Denies any swelling or redness or warmth of any joints.\par States she notices some lower back pain worse with sitting; better with stretching. Denies any loss of bladder or bowel incontinence or saddle anesthesias.\par States she notices that her toes turn purple mainly in the cold/ Raynaud's like and denies any ulcers.\par Denies any fever/chills, no rashes, no ulcers, no dry eyes, no dry mouth, no infectious diarrhea or  symptoms at this time.\par Reports hx of TIA & states she takes ASA daily; no blood clots; no miscarriages; no pregnancies. \par

## 2022-02-01 ENCOUNTER — NON-APPOINTMENT (OUTPATIENT)
Age: 72
End: 2022-02-01

## 2022-02-07 ENCOUNTER — OUTPATIENT (OUTPATIENT)
Dept: OUTPATIENT SERVICES | Facility: HOSPITAL | Age: 72
LOS: 1 days | End: 2022-02-07
Payer: MEDICARE

## 2022-02-07 ENCOUNTER — APPOINTMENT (OUTPATIENT)
Dept: RADIOLOGY | Facility: CLINIC | Age: 72
End: 2022-02-07

## 2022-02-07 ENCOUNTER — APPOINTMENT (OUTPATIENT)
Dept: RADIOLOGY | Facility: CLINIC | Age: 72
End: 2022-02-07
Payer: MEDICARE

## 2022-02-07 DIAGNOSIS — M54.50 LOW BACK PAIN, UNSPECIFIED: ICD-10-CM

## 2022-02-07 PROCEDURE — 72100 X-RAY EXAM L-S SPINE 2/3 VWS: CPT

## 2022-02-07 PROCEDURE — 72100 X-RAY EXAM L-S SPINE 2/3 VWS: CPT | Mod: 26

## 2022-02-24 ENCOUNTER — APPOINTMENT (OUTPATIENT)
Dept: RHEUMATOLOGY | Facility: CLINIC | Age: 72
End: 2022-02-24
Payer: MEDICARE

## 2022-02-24 VITALS
BODY MASS INDEX: 19.26 KG/M2 | WEIGHT: 102 LBS | HEIGHT: 61 IN | OXYGEN SATURATION: 98 % | DIASTOLIC BLOOD PRESSURE: 80 MMHG | TEMPERATURE: 97.6 F | HEART RATE: 68 BPM | SYSTOLIC BLOOD PRESSURE: 130 MMHG

## 2022-02-24 DIAGNOSIS — R79.89 OTHER SPECIFIED ABNORMAL FINDINGS OF BLOOD CHEMISTRY: ICD-10-CM

## 2022-02-24 PROCEDURE — 96372 THER/PROPH/DIAG INJ SC/IM: CPT

## 2022-02-24 PROCEDURE — 99214 OFFICE O/P EST MOD 30 MIN: CPT | Mod: 25

## 2022-02-24 RX ORDER — DENOSUMAB 60 MG/ML
60 INJECTION SUBCUTANEOUS
Qty: 1 | Refills: 0 | Status: COMPLETED | OUTPATIENT
Start: 2022-02-24

## 2022-02-24 RX ADMIN — DENOSUMAB 0 MG/ML: 60 INJECTION SUBCUTANEOUS at 00:00

## 2022-02-24 NOTE — PROCEDURE
[Today's Date:] : Date: [unfilled] [Risks] : risks [Benefits] : benefits [Alternatives] : alternatives [Consent Obtained] : written consent was obtained prior to the procedure and is detailed in the patient's record [Patient] : Prior to the start of the procedure a time out was taken and the identity of the patient was confirmed via name and date of birth with the patient. The correct site and the procedure to be performed were confirmed. The correct side was confirmed if applicable. The availability of the correct equipment was verified [Therapeutic] : therapeutic [#1 Site: ______] : #1 site identified in the [unfilled] [Ethyl Chloride] : ethyl chloride [Alcohol] : alcohol [Tolerated Well] : the patient tolerated the procedure well [No Complications] : there were no complications [de-identified] : Prolia 60mg/ml lot # [id number] exp 07/24

## 2022-02-24 NOTE — PHYSICAL EXAM
[General Appearance - Alert] : alert [General Appearance - In No Acute Distress] : in no acute distress [Sclera] : the sclera and conjunctiva were normal [Extraocular Movements] : extraocular movements were intact [Outer Ear] : the ears and nose were normal in appearance [Neck Appearance] : the appearance of the neck was normal [Respiration, Rhythm And Depth] : normal respiratory rhythm and effort [Heart Rate And Rhythm] : heart rate was normal and rhythm regular [Heart Sounds] : normal S1 and S2 [Abdomen Soft] : soft [Abdomen Tenderness] : non-tender [Cervical Lymph Nodes Enlarged Anterior Bilaterally] : anterior cervical [Supraclavicular Lymph Nodes Enlarged Bilaterally] : supraclavicular [No CVA Tenderness] : no ~M costovertebral angle tenderness [Motor Tone] : muscle strength and tone were normal [] : no rash [No Focal Deficits] : no focal deficits [Impaired Insight] : insight and judgment were intact [Mood] : the mood was normal [FreeTextEntry1] : heberden nodes at DIPs; No synovitis or effusion on exam noted today; good ROM in b/l shoulders, no pelvic/girdle stiffness and able to stand up without using her hands

## 2022-02-24 NOTE — HISTORY OF PRESENT ILLNESS
[FreeTextEntry1] : 71-year-old female here for first follow up.\par  Patient states that she is history of MS with neurologist Dr. Macias.\par Patient states she has history of osteoporosis for the past few years. Patient states in the past was treated with alendronate which was stopped because she kept forgetting to take pills.\par States she saw Dr. Cheung at [facility], Dr. Justice and had DEXA September 1, 2021 that was showing worsening of her osteoporosis so she was told to take Tymlos injections daily. Patient states she tried taking Tymlos for about 3 weeks but she kept getting tremors and shaking so she stopped it and refuses it.\par Patient defers Forteo injections at this time as well.\par States she is willing to be on Prolia and states discussed w/ her dentist and told ok to take.\par Patient denies any history of fractures as far.\par Patient states she is unsure if family history of osteoporosis.\par States she was never a smoker.\par States she knows cut down on vitamin D 5000 once a day w/ high vit D level on labs and take calcium 600 b.i.d.\par She is noted to have some Heberden nodes of her DIPs and denies any pain in the joints.\par Denies any swelling or redness or warmth of any joints.\par States she notices some lower back pain worse with sitting; better with stretching & did xray to review. Denies any loss of bladder or bowel incontinence or saddle anesthesias.\par States she notices that her toes turn purple mainly in the cold/ Raynaud's like and denies any ulcers.\par Denies any fever/chills, no rashes, no ulcers, no dry eyes, no dry mouth, no infectious diarrhea or  symptoms at this time.\par Reports hx of TIA & states she takes ASA daily; no blood clots; no miscarriages; no pregnancies. \par \par

## 2022-02-24 NOTE — ASSESSMENT
[FreeTextEntry1] : 71-year-old female, here for first follow up w/ hx of MS w/ Neuro; OA hands, reports of Raynaud's symptoms of feet turning purple in the cold to r/o CTD. \par She reports she also has Osteoporosis and was told by Rheum, Dr. Justice to take tymlos injections that she did for 3 weeks and had tremors and refuses it and agreeable to Prolia.\par -No synovitis or effusion on exam noted today and advised to monitor.\par -reviewed labs 3/18/22 w/ high Vit D to cut down the 5000 IU daily and will monitor on repeat; CBC ok; CMP ok\par \par OA hands: noted to have heberden nodes at DIPs with PIP hypertrophy without pain at this time and will monitor.\par \par Raynaud's: no ulcers and reports of color changes to purple in the feet w/ the cold. Discussed to keep hands warm and if not controlled can add Ca channel blocker if BP allows\par -will check serologies as below to r/o CTD to be complete \par \par LBP: intermittent \par -Reviewed xray LS spine 2/7/22 DDD; SI normal \par -Advil PRN w/ food needed sparingly helps\par \par Osteoporosis: [facility] t-sore -3.6 at left femoral neck and normal at AP spineL1-L4.\par -denies any hx of fractures; unsure if FH of osteoporosis; non-smoker \par -she refuses tymlos and defers forteo daily injections for 2yrs\par -states she was on alendronate few yrs ago but forgets to take it so defers it \par -Risks and benefits of prolia use were d/w patient including but not limited to myalgias, flu like symptoms, atypical fractures, infections, avascular necrosis of the jaw and hypocalcemia\par -msg sent to her Neuro, Dr. Macias to confirm ok to be on prolia w/ her MS as requested by patient and told should be fine\par -patient states she spoke with her dentist and told ok to get prolia and knows to avoid dnetal extraction and implants on it \par -today given Prolia 60mg/ml lot # [id number] exp 07/24 to Rt upper arm and tolerated well \par -aside from being postmenopausal put in to check for secondary causes of osteoporosis incld TSH, PTH, vit D & states she forgot to go from last visit and will go\par -encouraged Ca + low dose vit D supplementation (had high vit D on labs 3/18/21 informed)\par -encouraged weight bearing exercises as tolerated.\par \par -educated on symptoms to monitor for in detail and alert us if any concerns.\par -knows to stay up to date on health maintenance w/ PCP\par -f/u in 2-3 mo w/ labs or sooner as needed \par . \par

## 2022-02-24 NOTE — REASON FOR VISIT
[Follow-Up: _____] : a [unfilled] follow-up visit [FreeTextEntry1] : hx of Osteoporosis; hx of raynaud's; OA; review xray/labs/meds. \par

## 2022-03-08 ENCOUNTER — APPOINTMENT (OUTPATIENT)
Dept: NEUROLOGY | Facility: CLINIC | Age: 72
End: 2022-03-08
Payer: MEDICARE

## 2022-03-08 VITALS
WEIGHT: 103 LBS | TEMPERATURE: 97.7 F | DIASTOLIC BLOOD PRESSURE: 82 MMHG | BODY MASS INDEX: 20.22 KG/M2 | SYSTOLIC BLOOD PRESSURE: 135 MMHG | HEART RATE: 76 BPM | HEIGHT: 60 IN

## 2022-03-08 PROCEDURE — 99214 OFFICE O/P EST MOD 30 MIN: CPT

## 2022-03-08 RX ORDER — DIAZEPAM 2 MG/1
2 TABLET ORAL
Qty: 15 | Refills: 0 | Status: DISCONTINUED | COMMUNITY
Start: 2022-01-19 | End: 2022-03-08

## 2022-03-08 NOTE — PHYSICAL EXAM
[General Appearance - Alert] : alert [General Appearance - In No Acute Distress] : in no acute distress [Oriented To Time, Place, And Person] : oriented to person, place, and time [Mood] : the mood was normal [Person] : oriented to person [Place] : oriented to place [Time] : oriented to time [Registration Intact] : recent registration memory intact [Naming Objects] : no difficulty naming common objects [Repeating Phrases] : no difficulty repeating a phrase [Fluency] : fluency intact [Comprehension] : comprehension intact [Cranial Nerves Optic (II)] : visual acuity intact bilaterally,  visual fields full to confrontation, pupils equal round and reactive to light [Cranial Nerves Oculomotor (III)] : extraocular motion intact [Cranial Nerves Trigeminal (V)] : facial sensation intact symmetrically [Cranial Nerves Facial (VII)] : face symmetrical [Cranial Nerves Glossopharyngeal (IX)] : tongue and palate midline [Cranial Nerves Accessory (XI - Cranial And Spinal)] : head turning and shoulder shrug symmetric [Cranial Nerves Hypoglossal (XII)] : there was no tongue deviation with protrusion [Whisper Not Power] : whispered voice was not heard [Motor Tone] : muscle tone was normal in all four extremities [Sensation Tactile Decrease] : light touch was intact [Coordination - Dysmetria Impaired Finger-to-Nose Left Only] : present on the left side [2+] : Ankle jerk left 2+ [PERRL With Normal Accommodation] : pupils were equal in size, round, reactive to light, with normal accommodation [Extraocular Movements] : extraocular movements were intact [No APD] : no afferent pupillary defect [Full Visual Field] : full visual field [] : the neck was supple [Neck Cervical Mass (___cm)] : no neck mass was observed [Short Term Intact] : short term memory impaired [Concentration Intact] : a decrease in concentrating ability was observed [Romberg's Sign] : Romberg's sign was negtive [Dysdiadochokinesia Bilaterally] : not present [Plantar Reflex Right Only] : normal on the right [Plantar Reflex Left Only] : normal on the left [FreeTextEntry6] : No pronator drift, mild weakness in the left knee extensors, other muscle groups 5/5.  [FreeTextEntry8] : Coordination: Mildly ataxic gait

## 2022-03-08 NOTE — DISCUSSION/SUMMARY
[FreeTextEntry1] : 71-year-old female with PMHx of multiple sclerosis, diagnosed in her 40's, has been on  Avonex weekly IM injections, was switched to Tecfidera, for a short time, discontinued as her blood work was abnormal, was under care of neurologist Dr. Sharpe previously.\par \par # Multiple sclerosis possibly relapsing remitting; stable with mild ataxic gait, MRI brain reveals no new enhancing lesions\par \par - I have recommended continuing Avonex IM injections weekly\par -Continue vitamin D 3000 IU daily\par \par # New onset dizziness / vertigo; most Likely positional, resolved\par \par # Mild cognitive impairment; most likely related to MS\par \par - Continue donepezil 10 mg daily, could not tolerate higher dose\par \par # Fatigue/anxiety -most likely related to MS\par \par - continue Venlafaxine 37.5 QD\par \par # MRI brain  reveals old lacunar infarcts\par \par - continue BASA and low dose atorvastatin

## 2022-03-08 NOTE — HISTORY OF PRESENT ILLNESS
[FreeTextEntry1] : Patient for follow-up visit, last seen on 1/6/22. Patient continues to have intermittent anxiety and fatigue, she is taking venlafaxine 37.5 mg daily, tolerating it well, reports she is feeling the best ever in a long time, is taking Vit D 3000 IU, extra magnesium and calcium.  She has  who is accompanying her reports that her cognition and memory are very poor, she has difficulty processing things, she used to sing but does not have much opportunity to do that now.\par \par Patient continues to take Avonex weekly injections, has had no adverse effects, is tolerating it well, in addition she takes donepezil 10 mg daily, could not tolerate higher dose.\par \par  \par

## 2022-03-08 NOTE — DATA REVIEWED
[No studies available for review at this time.] : No studies available for review at this time. [de-identified] : 6/7/21: MRI brain reveals diffuse leukoencephalopathy consistent with advanced MS.. There is focal abnormal signal in the left putamen suspicious for a remote lacunar type infarct, unchanged since the prior exam of 9/12/2017. Two more small foci one in anika and other in right cerebellar hemispheric consistent with remote blood products from CVA's, new since the most recent prior MRI. No acute infarcts. \par  [de-identified] : 3/18/21: vitamin D 160, B12 489, CBC and LFT's are normal\par

## 2022-04-06 NOTE — ED ADULT NURSE NOTE - CHIEF COMPLAINT QUOTE
Problem: Falls - Risk of  Goal: *Absence of Falls  Description: Document [address] Fall Risk and appropriate interventions in the flowsheet.   Outcome: Progressing Towards Goal  Note: Fall Risk Interventions:  Mobility Interventions: Bed/chair exit alarm,Strengthening exercises (ROM-active/passive),Utilize walker, cane, or other assistive device         Medication Interventions: Bed/chair exit alarm,Patient to call before getting OOB,Teach patient to arise slowly                   Problem: Hypertension  Goal: *Blood pressure within specified parameters  Outcome: Progressing Towards Goal  Goal: *Labs within defined limits  Outcome: Progressing Towards Goal     Problem: Patient Education: Go to Patient Education Activity  Goal: Patient/Family Education  Outcome: Progressing Towards Goal Patient reports seeing flashing lights while watching tv, hx of MS and stroke. Speech is clear at this time patient is AAOx4, denies pain.

## 2022-05-10 ENCOUNTER — LABORATORY RESULT (OUTPATIENT)
Age: 72
End: 2022-05-10

## 2022-05-11 LAB
25(OH)D3 SERPL-MCNC: 71.8 NG/ML
25(OH)D3 SERPL-MCNC: 75.8 NG/ML
ALBUMIN SERPL ELPH-MCNC: 4 G/DL
ALP BLD-CCNC: 85 U/L
ALT SERPL-CCNC: 37 U/L
ANION GAP SERPL CALC-SCNC: 12 MMOL/L
ANION GAP SERPL CALC-SCNC: 13 MMOL/L
AST SERPL-CCNC: 36 U/L
BILIRUB SERPL-MCNC: 0.3 MG/DL
BUN SERPL-MCNC: 25 MG/DL
BUN SERPL-MCNC: 26 MG/DL
CALCIUM SERPL-MCNC: 9.3 MG/DL
CCP AB SER IA-ACNC: <8 UNITS
CCP AB SER IA-ACNC: <8 UNITS
CENTROMERE IGG SER-ACNC: <0.2 CD:130001892
CENTROMERE IGG SER-ACNC: <0.2 CD:130001892
CHLORIDE SERPL-SCNC: 104 MMOL/L
CHLORIDE SERPL-SCNC: 104 MMOL/L
CK SERPL-CCNC: 53 U/L
CO2 SERPL-SCNC: 26 MMOL/L
CO2 SERPL-SCNC: 27 MMOL/L
CREAT SERPL-MCNC: 0.82 MG/DL
CREAT SERPL-MCNC: 0.83 MG/DL
CRP SERPL-MCNC: <3 MG/L
EGFR: 76 ML/MIN/1.73M2
EGFR: 77 ML/MIN/1.73M2
ENA RNP AB SER IA-ACNC: 3.9 AL
ENA RNP AB SER IA-ACNC: 3.9 AL
ENA SCL70 IGG SER IA-ACNC: <0.2 AL
ENA SCL70 IGG SER IA-ACNC: <0.2 AL
ENA SM AB SER IA-ACNC: <0.2 AL
ENA SM AB SER IA-ACNC: <0.2 AL
ENA SS-A AB SER IA-ACNC: <0.2 AL
ENA SS-A AB SER IA-ACNC: <0.2 AL
ENA SS-B AB SER IA-ACNC: <0.2 AL
ENA SS-B AB SER IA-ACNC: <0.2 AL
GLUCOSE SERPL-MCNC: 175 MG/DL
GLUCOSE SERPL-MCNC: 185 MG/DL
PARATHYROID HORMONE INTACT: 33 PG/ML
PARATHYROID HORMONE INTACT: 38 PG/ML
POTASSIUM SERPL-SCNC: 4.5 MMOL/L
POTASSIUM SERPL-SCNC: 4.8 MMOL/L
PROT SERPL-MCNC: 6.4 G/DL
RF+CCP IGG SER-IMP: NEGATIVE
RF+CCP IGG SER-IMP: NEGATIVE
RHEUMATOID FACT SER QL: <10 IU/ML
RHEUMATOID FACT SER QL: <10 IU/ML
SODIUM SERPL-SCNC: 143 MMOL/L
SODIUM SERPL-SCNC: 143 MMOL/L
TSH SERPL-ACNC: 4.39 UIU/ML
TSH SERPL-ACNC: 4.61 UIU/ML

## 2022-05-12 LAB — ANA SER IF-ACNC: NEGATIVE

## 2022-05-15 LAB
ANA SER IF-ACNC: NEGATIVE
G6PD SER-CCNC: 10.7 U/G HGB

## 2022-06-14 ENCOUNTER — NON-APPOINTMENT (OUTPATIENT)
Age: 72
End: 2022-06-14

## 2022-07-07 ENCOUNTER — APPOINTMENT (OUTPATIENT)
Dept: RHEUMATOLOGY | Facility: CLINIC | Age: 72
End: 2022-07-07

## 2022-08-31 ENCOUNTER — APPOINTMENT (OUTPATIENT)
Dept: RHEUMATOLOGY | Facility: CLINIC | Age: 72
End: 2022-08-31

## 2022-08-31 VITALS
OXYGEN SATURATION: 98 % | TEMPERATURE: 97.6 F | BODY MASS INDEX: 20.02 KG/M2 | SYSTOLIC BLOOD PRESSURE: 122 MMHG | DIASTOLIC BLOOD PRESSURE: 72 MMHG | WEIGHT: 102.5 LBS | HEART RATE: 78 BPM

## 2022-08-31 DIAGNOSIS — I73.00 RAYNAUD'S SYNDROME W/OUT GANGRENE: ICD-10-CM

## 2022-08-31 PROCEDURE — 99214 OFFICE O/P EST MOD 30 MIN: CPT

## 2022-08-31 NOTE — REASON FOR VISIT
[Follow-Up: _____] : a [unfilled] follow-up visit [FreeTextEntry1] : +RNP; OA; Osteoporosis; review labs/meds

## 2022-08-31 NOTE — ASSESSMENT
[FreeTextEntry1] : 71-year-old female, here for follow up w/ hx of MS w/ Neuro; OA hands; reports of Raynaud's symptoms of feet turning purple in the cold w/ +RNP ab. \par She reports she also has Osteoporosis and was told by Rheum, Dr. Justice to take tymlos injections that she did for 3 weeks and had tremors and refuses it and agreeable to Prolia.\par \par +RNP: other than raynaud's of the feet at times she denies much other symptoms of MCTD at this time; and educated on symptoms to monitor for in detail if she evolves to alert us \par -No synovitis or effusion on exam noted today and advised to monitor.\par -Raynaud's: no ulcers and reports of color changes to purple in the feet w/ the cold at times. Discussed to keep feet warm and if not controlled can add Ca channel blocker if BP allows\par -referred to Pulm for PFTs/chest imaging for +RNP to r/o ILD as discussed \par -referred for Echo to r/o pulmonary hypertension w/ +RNP; reports Echo w/ her cardiologist about 2 yrs ago was normal\par \par OA hands: noted to have heberden nodes at DIPs with PIP hypertrophy without pain at this time and will monitor.\par \par LBP: intermittent; not much today \par -xray LS spine 2/7/22 DDD; SI normal \par -Advil PRN w/ food needed sparingly helps\par \par Osteoporosis: Dexa 9/1/2021 from [facility] w/ lowest t-sore -3.6 at left femoral neck and normal at AP spineL1-L4.\par -Holding Prolia today as states seeing dentist soon for possible implant but she is unsure, and will alert us as soon as ready to get prolia and defers other tx options at this time. Discussed to bring note of clearance from dentist if gets implant when prolia ok to resume.  \par -denies any hx of fractures; unsure if FH of osteoporosis; non-smoker \par -she refuses tymlos and defers forteo daily injections for 2yrs\par -states she was on alendronate few yrs ago but forgets to take it so defers it \par -msg sent to her Neuro, Dr. Macias to confirm ok to be on prolia w/ her MS as requested by patient and told should be fine\par -aside from being postmenopausal put in to checked for secondary causes of osteoporosis incld normal PTH, normal vit D; high TSH (told to adjust Synthroid w/ her PCP please)\par -encouraged Ca + vit D supplementation\par -encouraged weight bearing exercises as tolerated.\par \par -educated on symptoms to monitor for in detail and alert us if any concerns.\par -knows to stay up to date on health maintenance w/ PCP\par -f/u in 2-3 mo w/ labs and for Prolia or sooner as needed \par . \par

## 2022-08-31 NOTE — PHYSICAL EXAM
[General Appearance - Alert] : alert [General Appearance - In No Acute Distress] : in no acute distress [Sclera] : the sclera and conjunctiva were normal [Extraocular Movements] : extraocular movements were intact [Outer Ear] : the ears and nose were normal in appearance [Neck Appearance] : the appearance of the neck was normal [Respiration, Rhythm And Depth] : normal respiratory rhythm and effort [Heart Rate And Rhythm] : heart rate was normal and rhythm regular [Heart Sounds] : normal S1 and S2 [Abdomen Soft] : soft [Abdomen Tenderness] : non-tender [Cervical Lymph Nodes Enlarged Anterior Bilaterally] : anterior cervical [Supraclavicular Lymph Nodes Enlarged Bilaterally] : supraclavicular [No CVA Tenderness] : no ~M costovertebral angle tenderness [Motor Tone] : muscle strength and tone were normal [] : no rash [No Focal Deficits] : no focal deficits [Impaired Insight] : insight and judgment were intact [Mood] : the mood was normal [FreeTextEntry1] : No synovitis or effusion on exam noted today; Good ROM in b/l shoulders, no pelvic/girdle stiffness and able to stand up without using her hands

## 2022-08-31 NOTE — HISTORY OF PRESENT ILLNESS
[FreeTextEntry1] : 71-year-old female here for follow up w/ hx of MS w/ Neuro; OA hands; reports of Raynaud's symptoms of feet turning purple in the cold w/ +RNP ab. \par She reports she also has Osteoporosis and was told by [facility], Dr. Justice to take tymlos injections that she did for 3 weeks and had tremors and refuses it and agreeable to Prolia.\par \par Today she states she wants to hold the prolia until she is seeing her dentist soon because she might need an implant but states she is unsure if she will pursue it and will alert us when ready to get the prolia. \par Patient states she has history of osteoporosis for the past few years. Patient states in the past was treated with alendronate which was stopped because she kept forgetting to take pills.\par States she saw Dr. Cheung at [facility], Dr. Justice and had DEXA September 1, 2021 that was showing worsening of her osteoporosis so she was told to take Tymlos injections daily. Patient states she tried taking Tymlos for about 3 weeks but she kept getting tremors and shaking so she stopped it and refuses it.\par Patient defers Forteo injections at this time as well.\par Patient denies any history of fractures as far.\par Patient states she is unsure if family history of osteoporosis.\par States she was never a smoker.\par States she knows cut down on vitamin D 5000 once a day w/ high vit D level on labs and take calcium 600 b.i.d.\par \par She is noted to have some Heberden nodes of her DIPs and denies any pain in the joints.\par Denies any swelling or redness or warmth of any joints.\par States she notices some lower back pain worse with sitting; better with stretching; not much pain today. Denies any loss of bladder or bowel incontinence or saddle anesthesias.\par States she notices that her toes turn purple mainly in the cold/ Raynaud's like and denies any ulcers.\par Denies any fever/chills, no rashes, no ulcers, no dry eyes, no dry mouth, no infectious diarrhea or  symptoms at this time.\par Reports hx of TIA & states she takes ASA daily; no blood clots; no miscarriages; no pregnancies.\par Denies any SOB or cough at this time.\par  \par \par

## 2022-10-17 ENCOUNTER — NON-APPOINTMENT (OUTPATIENT)
Age: 72
End: 2022-10-17

## 2022-10-17 RX ORDER — VENLAFAXINE HYDROCHLORIDE 37.5 MG/1
37.5 CAPSULE, EXTENDED RELEASE ORAL
Qty: 90 | Refills: 0 | Status: COMPLETED | COMMUNITY
Start: 2021-05-03 | End: 2022-10-17

## 2022-10-17 RX ORDER — L.ACID,FERM,PLA,RHA/B.BIF,LONG 126 MG
TABLET, DELAYED AND EXTENDED RELEASE ORAL
Refills: 0 | Status: ACTIVE | COMMUNITY
Start: 2022-10-17

## 2022-10-17 RX ORDER — OMEGA-3/DHA/EPA/FISH OIL 300-1000MG
400 CAPSULE ORAL
Refills: 0 | Status: ACTIVE | COMMUNITY
Start: 2022-10-17

## 2022-10-18 ENCOUNTER — APPOINTMENT (OUTPATIENT)
Dept: CARDIOLOGY | Facility: CLINIC | Age: 72
End: 2022-10-18

## 2022-10-18 ENCOUNTER — NON-APPOINTMENT (OUTPATIENT)
Age: 72
End: 2022-10-18

## 2022-10-18 VITALS
SYSTOLIC BLOOD PRESSURE: 160 MMHG | BODY MASS INDEX: 20.03 KG/M2 | WEIGHT: 102 LBS | HEIGHT: 60 IN | DIASTOLIC BLOOD PRESSURE: 78 MMHG | HEART RATE: 64 BPM | OXYGEN SATURATION: 99 %

## 2022-10-18 DIAGNOSIS — R01.2 OTHER CARDIAC SOUNDS: ICD-10-CM

## 2022-10-18 PROCEDURE — 93000 ELECTROCARDIOGRAM COMPLETE: CPT

## 2022-10-18 PROCEDURE — 99204 OFFICE O/P NEW MOD 45 MIN: CPT

## 2022-10-18 NOTE — ASSESSMENT
[FreeTextEntry1] : 71-year-old female, here for first time w/ hx of MS w/ Neuro; OA hands; reports of Raynaud's symptoms

## 2022-10-18 NOTE — HISTORY OF PRESENT ILLNESS
[FreeTextEntry1] : 71-year-old female, here for first time w/ hx of MS w/ Neuro; OA hands; reports of Raynaud's symptoms of feet turning purple in the cold w/ +RNP ab. No shortness of breath. Is failry active with . Sees Dr. Wong from Rheumatology. Here for evaluation for pulmonary HTN but is unaware of any echo. \par Otherwise asymptotic. \par

## 2022-10-18 NOTE — DISCUSSION/SUMMARY
[FreeTextEntry1] : 71-year-old female, here for first time w/ hx of MS w/ Neuro; OA hands; reports of Raynaud's symptoms of feet here for evaluation for phTN. Does have prominent p2 on asucultation. Plan for TTE to evaluate pulmonary pressures non invassively. Will follow up post TTE.\par \par Differential diagnosis and plan of care discussed with patient after the evaluation. \par Counseling on diet, exercise, and medication compliance was done.\par Counseling on smoking and alcohol cessation was offered when appropriate.\par Pain assessed and judicious use of narcotics when appropriate was discussed.\par Importance of fall prevention discussed.\par Advanced care planning was discussed with patient and family.\par  [EKG obtained to assist in diagnosis and management of assessed problem(s)] : EKG obtained to assist in diagnosis and management of assessed problem(s)

## 2022-11-02 ENCOUNTER — APPOINTMENT (OUTPATIENT)
Dept: NEUROLOGY | Facility: CLINIC | Age: 72
End: 2022-11-02

## 2022-11-02 ENCOUNTER — NON-APPOINTMENT (OUTPATIENT)
Age: 72
End: 2022-11-02

## 2022-11-02 VITALS
HEART RATE: 80 BPM | BODY MASS INDEX: 19.26 KG/M2 | HEIGHT: 61 IN | TEMPERATURE: 97.8 F | SYSTOLIC BLOOD PRESSURE: 127 MMHG | DIASTOLIC BLOOD PRESSURE: 81 MMHG | WEIGHT: 102 LBS

## 2022-11-02 PROCEDURE — 99213 OFFICE O/P EST LOW 20 MIN: CPT

## 2022-11-02 NOTE — PHYSICAL EXAM
[General Appearance - Alert] : alert [General Appearance - In No Acute Distress] : in no acute distress [Oriented To Time, Place, And Person] : oriented to person, place, and time [Mood] : the mood was normal [Person] : oriented to person [Place] : oriented to place [Time] : oriented to time [Registration Intact] : recent registration memory intact [Naming Objects] : no difficulty naming common objects [Repeating Phrases] : no difficulty repeating a phrase [Fluency] : fluency intact [Comprehension] : comprehension intact [Cranial Nerves Optic (II)] : visual acuity intact bilaterally,  visual fields full to confrontation, pupils equal round and reactive to light [Cranial Nerves Oculomotor (III)] : extraocular motion intact [Cranial Nerves Trigeminal (V)] : facial sensation intact symmetrically [Cranial Nerves Facial (VII)] : face symmetrical [Cranial Nerves Glossopharyngeal (IX)] : tongue and palate midline [Cranial Nerves Accessory (XI - Cranial And Spinal)] : head turning and shoulder shrug symmetric [Cranial Nerves Hypoglossal (XII)] : there was no tongue deviation with protrusion [Whisper Not [place]] : whispered voice was not heard [Motor Tone] : muscle tone was normal in all four extremities [Sensation Tactile Decrease] : light touch was intact [Coordination - Dysmetria Impaired Finger-to-Nose Left Only] : present on the left side [2+] : Ankle jerk left 2+ [PERRL With Normal Accommodation] : pupils were equal in size, round, reactive to light, with normal accommodation [Extraocular Movements] : extraocular movements were intact [No APD] : no afferent pupillary defect [Full Visual Field] : full visual field [] : the neck was supple [Neck Cervical Mass (___cm)] : no neck mass was observed [Short Term Intact] : short term memory impaired [Concentration Intact] : a decrease in concentrating ability was observed [Romberg's Sign] : Romberg's sign was negtive [Dysdiadochokinesia Bilaterally] : not present [Plantar Reflex Right Only] : normal on the right [Plantar Reflex Left Only] : normal on the left [FreeTextEntry6] : No pronator drift, mild weakness in the left knee extensors, other muscle groups 5/5.  [FreeTextEntry8] : Coordination: Mildly ataxic gait

## 2022-11-02 NOTE — DISCUSSION/SUMMARY
[FreeTextEntry1] : 71-year-old F with PMHx of multiple sclerosis, diagnosed in her 40's, has been on  Avonex weekly IM injections, was switched to Tecfidera, for a short time, discontinued as her blood work was abnormal, was under care of neurologist Dr. Sharpe previously.\par \par # Multiple sclerosis possibly relapsing remitting; stable with mild ataxic gait, MRI brain reveals no new enhancing lesions\par \par - I have recommended continuing Avonex IM injections weekly\par -Continue vitamin D 3000 IU daily\par \par # New onset dizziness / vertigo; most Likely positional, resolved\par \par # Mild cognitive impairment; most likely related to MS\par \par - Continue donepezil 10 mg daily\par \par # Fatigue/anxiety -most likely related to MS\par \par - continue Venlafaxine 37.5 QD\par \par # MRI brain  reveals old lacunar infarcts\par \par - continue BASA and low dose atorvastatin

## 2022-11-02 NOTE — HISTORY OF PRESENT ILLNESS
[FreeTextEntry1] : Patient for follow-up visit, last seen on 3/8/22. Patient was noted improvement of anxiety and fatigue, she is taking venlafaxine 37.5 mg daily, tolerating it well, reports she feels better, is more active,   who is accompanying her reports that her cognition and memory are stable, she has difficulty processing things,\par She is on donepezil 10 mg daily, but reports she has been breaking it into half and is only taking 5 mg.  She has not had any adverse reaction with the medication.\par \par Patient continues to take Avonex weekly injections, has had no adverse effects, is tolerating it well.\par  \par

## 2022-11-02 NOTE — DATA REVIEWED
[No studies available for review at this time.] : No studies available for review at this time. [de-identified] : 6/7/21: MRI brain reveals diffuse leukoencephalopathy consistent with advanced MS.. There is focal abnormal signal in the left putamen suspicious for a remote lacunar type infarct, unchanged since the prior exam of 9/12/2017. Two more small foci one in anika and other in right cerebellar hemispheric consistent with remote blood products from CVA's, new since the most recent prior MRI. No acute infarcts. \par  [de-identified] : 3/18/21: vitamin D 160, B12 489, CBC and LFT's are normal\par

## 2022-11-02 NOTE — REVIEW OF SYSTEMS
[Memory Lapses or Loss] : memory loss [Decr. Concentrating Ability] : decreased concentrating ability [Poor Coordination] : poor coordination [Difficulties in Speech] : speech difficulties [Numbness] : numbness [Dizziness] : dizziness [As Noted in HPI] : as noted in HPI [Anxiety] : anxiety [Incontinence] : incontinence [Negative] : Heme/Lymph [Feeling Tired] : not feeling tired [Tingling] : no tingling

## 2022-11-11 ENCOUNTER — OUTPATIENT (OUTPATIENT)
Dept: OUTPATIENT SERVICES | Facility: HOSPITAL | Age: 72
LOS: 1 days | End: 2022-11-11
Payer: MEDICARE

## 2022-11-11 DIAGNOSIS — R01.2 OTHER CARDIAC SOUNDS: ICD-10-CM

## 2022-11-11 PROCEDURE — 93306 TTE W/DOPPLER COMPLETE: CPT

## 2022-11-11 PROCEDURE — 93306 TTE W/DOPPLER COMPLETE: CPT | Mod: 26

## 2022-11-12 DIAGNOSIS — R01.2 OTHER CARDIAC SOUNDS: ICD-10-CM

## 2022-11-29 ENCOUNTER — APPOINTMENT (OUTPATIENT)
Dept: CARDIOLOGY | Facility: CLINIC | Age: 72
End: 2022-11-29

## 2022-11-29 VITALS
DIASTOLIC BLOOD PRESSURE: 66 MMHG | OXYGEN SATURATION: 98 % | HEART RATE: 68 BPM | TEMPERATURE: 98.7 F | SYSTOLIC BLOOD PRESSURE: 155 MMHG

## 2022-11-29 PROCEDURE — 99215 OFFICE O/P EST HI 40 MIN: CPT

## 2022-11-29 PROCEDURE — 93000 ELECTROCARDIOGRAM COMPLETE: CPT

## 2022-11-30 RX ORDER — PHENOL 1.4 %
600-400 AEROSOL, SPRAY (ML) MUCOUS MEMBRANE
Qty: 60 | Refills: 1 | Status: DISCONTINUED | COMMUNITY
Start: 2022-02-24 | End: 2022-11-30

## 2022-12-01 LAB
25(OH)D3 SERPL-MCNC: 54.7 NG/ML
ANION GAP SERPL CALC-SCNC: 10 MMOL/L
BUN SERPL-MCNC: 23 MG/DL
CALCIUM SERPL-MCNC: 9.6 MG/DL
CHLORIDE SERPL-SCNC: 101 MMOL/L
CO2 SERPL-SCNC: 29 MMOL/L
CREAT SERPL-MCNC: 0.8 MG/DL
CRP SERPL-MCNC: <3 MG/L
EGFR: 79 ML/MIN/1.73M2
ERYTHROCYTE [SEDIMENTATION RATE] IN BLOOD BY WESTERGREN METHOD: 18 MM/HR
GLUCOSE SERPL-MCNC: 168 MG/DL
POTASSIUM SERPL-SCNC: 4.1 MMOL/L
SODIUM SERPL-SCNC: 139 MMOL/L

## 2022-12-12 ENCOUNTER — APPOINTMENT (OUTPATIENT)
Dept: RHEUMATOLOGY | Facility: CLINIC | Age: 72
End: 2022-12-12

## 2022-12-12 VITALS
SYSTOLIC BLOOD PRESSURE: 116 MMHG | HEART RATE: 87 BPM | WEIGHT: 104.5 LBS | TEMPERATURE: 97.6 F | DIASTOLIC BLOOD PRESSURE: 68 MMHG | BODY MASS INDEX: 19.75 KG/M2 | OXYGEN SATURATION: 97 %

## 2022-12-12 PROCEDURE — 99214 OFFICE O/P EST MOD 30 MIN: CPT | Mod: 25

## 2022-12-12 PROCEDURE — 96372 THER/PROPH/DIAG INJ SC/IM: CPT

## 2022-12-12 RX ORDER — IBUPROFEN 800 MG/1
800 TABLET, FILM COATED ORAL
Qty: 15 | Refills: 0 | Status: DISCONTINUED | COMMUNITY
Start: 2022-09-14

## 2022-12-12 RX ORDER — CIPROFLOXACIN HYDROCHLORIDE 250 MG/1
250 TABLET, FILM COATED ORAL
Qty: 14 | Refills: 0 | Status: DISCONTINUED | COMMUNITY
Start: 2022-09-14

## 2022-12-12 RX ORDER — DENOSUMAB 60 MG/ML
60 INJECTION SUBCUTANEOUS
Qty: 1 | Refills: 0 | Status: COMPLETED | OUTPATIENT
Start: 2022-12-12

## 2022-12-12 RX ORDER — CHLORHEXIDINE GLUCONATE, 0.12% ORAL RINSE 1.2 MG/ML
0.12 SOLUTION DENTAL
Qty: 473 | Refills: 0 | Status: DISCONTINUED | COMMUNITY
Start: 2022-09-14

## 2022-12-12 RX ADMIN — DENOSUMAB 0 MG/ML: 60 INJECTION SUBCUTANEOUS at 00:00

## 2022-12-12 NOTE — REASON FOR VISIT
[Follow-Up: _____] : a [unfilled] follow-up visit [FreeTextEntry1] : +RNP: OA; Osteoporosis; review labs/med; get prolia

## 2022-12-12 NOTE — ASSESSMENT
[FreeTextEntry1] : 72-year-old female, here for follow up w/ hx of MS w/ Neuro; OA hands; w/ +RNP ab. \par She reports she also has Osteoporosis and was told by Rheum, Dr. Justice to take tymlos injections that she did for 3 weeks and had tremors and refuses it and agreeable to Prolia.\par -reviewed labs 11/30/22 w/ BMP w/ Ca normal; vit D normal; ESR/CRP normal \par \par +RNP: denies much symptoms of MCTD at this time; and educated on symptoms to monitor for in detail if she evolves to alert us \par -No synovitis or effusion on exam noted today and advised to monitor.\par -knows can see Pulm for PFTs/chest imaging for +RNP to r/o ILD as discussed \par -states her Echo w/ her cardiologist normal without pulmonary hypertension and monitor there\par \par OA hands: noted to have heberden nodes at DIPs with PIP hypertrophy without pain at this time and will monitor.\par \par LBP: intermittent; not much today \par -xray LS spine 2/7/22 DDD; SI normal \par -Advil PRN w/ food needed sparingly helps\par \par Osteoporosis: Dexa 9/1/2021 from [facility] w/ lowest t-sore -3.6 at left femoral neck and normal at AP spineL1-L4.\par -reviewed note from her dentist, Dr. Carlton Lugo 12/9/22 stating she has medical clearance for Prolia\par -Risks and benefits of prolia use were d/w patient including but not limited to myalgias, flu like symptoms, atypical fractures, infections, avascular necrosis of the jaw and hypocalcemia.\par -reviewed labs 11/30/22 w/ BMP w/ normal Ca=9.6\par -today given prolia 60mg/ml lot # [id number] exp 11/24 to left upper arm and tolerated well \par -denies any hx of fractures; unsure if FH of osteoporosis; non-smoker \par -she refuses tymlos and defers forteo daily injections for 2yrs\par -states she was on alendronate few yrs ago but forgets to take it so defers it \par -msg sent to her Neuro, Dr. Macias to confirm ok to be on prolia w/ her MS as requested by patient and told should be fine\par -aside from being postmenopausal put in to checked for secondary causes of osteoporosis incld normal PTH, normal vit D; high TSH (told to adjust Synthroid w/ her PCP please)\par -encouraged Ca + vit D supplementation\par -encouraged weight bearing exercises as tolerated.\par \par -educated on symptoms to monitor for in detail and alert us if any concerns.\par -knows to stay up to date on health maintenance w/ PCP\par -f/u in 6 mo w/ labs and for Prolia or sooner as needed \par . \par  \par

## 2022-12-12 NOTE — PROCEDURE
[Today's Date:] : Date: [unfilled] [Risks] : risks [Benefits] : benefits [Alternatives] : alternatives [Consent Obtained] : written consent was obtained prior to the procedure and is detailed in the patient's record [Patient] : Prior to the start of the procedure a time out was taken and the identity of the patient was confirmed via name and date of birth with the patient. The correct site and the procedure to be performed were confirmed. The correct side was confirmed if applicable. The availability of the correct equipment was verified [Therapeutic] : therapeutic [#1 Site: ______] : #1 site identified in the [unfilled] [Ethyl Chloride] : ethyl chloride [Alcohol] : alcohol [Tolerated Well] : the patient tolerated the procedure well [No Complications] : there were no complications [de-identified] : Prolia 60mg/ml lot # [id number] exp 11/24

## 2022-12-12 NOTE — HISTORY OF PRESENT ILLNESS
[FreeTextEntry1] : 72-year-old female here for follow up w/ hx of MS w/ Neuro; OA hands; +RNP ab. \par She reports she also has Osteoporosis and was told by [facility], Dr. Justice to take tymlos injections that she did for 3 weeks and had tremors and refuses it and agreeable to Prolia.\par \par Today she states she wants to get the prolia today. She has note from her dentist, Dr. Carlton Lugo 12/9/22 stating she has medical clearance for Prolia. \par Patient states she has history of osteoporosis for the past few years. Patient states in the past was treated with alendronate which was stopped because she kept forgetting to take pills.\par States she saw Dr. Cheung at [facility], Dr. Justice and had DEXA September 1, 2021 that was showing worsening of her osteoporosis so she was told to take Tymlos injections daily. Patient states she tried taking Tymlos for about 3 weeks but she kept getting tremors and shaking so she stopped it and refuses it.\par Patient defers Forteo injections at this time as well.\par Patient denies any history of fractures as far.\par Patient states she is unsure if family history of osteoporosis.\par States she was never a smoker.\par States she knows cut down on vitamin D 5000 once a day w/ high vit D level on labs and take calcium 600 b.i.d.\par \par She is noted to have some Heberden nodes of her DIPs and denies any pain in the joints.\par Denies any swelling or redness or warmth of any joints.\par States she notices some lower back pain worse with sitting; better with stretching; not much pain today. Denies any loss of bladder or bowel incontinence or saddle anesthesias.\par States the Raynaud's like symptoms in the feet are resolved now. No ulcers. \par Denies any fever/chills, no rashes, no ulcers, no dry eyes, no dry mouth, no infectious diarrhea or  symptoms at this time.\par Reports hx of TIA & states she takes ASA daily; no blood clots; no miscarriages; no pregnancies.\par Denies any SOB or cough at this time.\par

## 2022-12-13 NOTE — CARDIOLOGY SUMMARY
[de-identified] : 11/11/2022:\par  The left ventricle is normal in size, wall thickness, wall motion and\par  contractility.\par  Estimated left ventricular ejection fraction is 55-60 %.\par  The aortic valve is trileaflet with thin pliable leaflets.\par  Mild to Moderate aortic regurgitation is present.\par  The mitral valve leaflets appear thickened.\par  Mild (1+) mitral regurgitation is present.\par  EA reversal of the mitral inflow consistent with reduced compliance of \par the\par  left ventricle.\par  The tricuspid valve leaflets are thin and pliable; valve motion is \par normal.\par  Mild (1+) tricuspid valve regurgitation is present.\par  Normal appearing pulmonic valve structure.\par  Mild pulmonic valvular regurgitation (1+) is present.

## 2022-12-13 NOTE — DISCUSSION/SUMMARY
[FreeTextEntry1] : 71-year-old female, here for first time w/ hx of MS w/ Neuro; OA hands; reports of Raynaud's symptoms of feet here for evaluation for phTN. Does have prominent p2 on auscultation however normal Pa pressures. Plan to continue losartan for HTN without any need for CCB etc. Patient to follow up with rheumatology and cardiology as needed for risk factor modification. \par \par Differential diagnosis and plan of care discussed with patient after the evaluation. \par Counseling on diet, exercise, and medication compliance was done.\par Counseling on smoking and alcohol cessation was offered when appropriate.\par Pain assessed and judicious use of narcotics when appropriate was discussed.\par Importance of fall prevention discussed.\par Advanced care planning was discussed with patient and family.\par

## 2022-12-13 NOTE — HISTORY OF PRESENT ILLNESS
[FreeTextEntry1] : 71-year-old female, here for first time w/ hx of MS w/ Neuro; OA hands; reports of Raynaud's symptoms of feet turning purple in the cold w/ +RNP ab. No shortness of breath. Is failry active with . Sees Dr. Wong from Rheumatology. Here for evaluation for pulmonary HTN with recent echo done that showed normal Pa pressures without any RV or Ra enlargement. \par

## 2023-05-05 ENCOUNTER — APPOINTMENT (OUTPATIENT)
Dept: NEUROLOGY | Facility: CLINIC | Age: 73
End: 2023-05-05
Payer: MEDICARE

## 2023-05-05 VITALS
WEIGHT: 104 LBS | HEART RATE: 77 BPM | BODY MASS INDEX: 19.63 KG/M2 | TEMPERATURE: 97.8 F | SYSTOLIC BLOOD PRESSURE: 144 MMHG | HEIGHT: 61 IN | DIASTOLIC BLOOD PRESSURE: 85 MMHG
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
## 2023-05-05 DIAGNOSIS — R42 DIZZINESS AND GIDDINESS: ICD-10-CM

## 2023-05-05 PROCEDURE — 99214 OFFICE O/P EST MOD 30 MIN: CPT

## 2023-05-05 NOTE — DATA REVIEWED
[No studies available for review at this time.] : No studies available for review at this time. [de-identified] : 6/7/21: MRI brain reveals diffuse leukoencephalopathy consistent with advanced MS.. There is focal abnormal signal in the left putamen suspicious for a remote lacunar type infarct, unchanged since the prior exam of 9/12/2017. Two more small foci one in anika and other in right cerebellar hemispheric consistent with remote blood products from CVA's, new since the most recent prior MRI. No acute infarcts. \par  [de-identified] : 3/18/21: vitamin D 160, B12 489, CBC and LFT's are normal\par

## 2023-05-05 NOTE — HISTORY OF PRESENT ILLNESS
[FreeTextEntry1] : Patient  is here for follow-up visit, last seen on 11/2/22. Patient and  report that 3 weeks ago while she was trying to do laundry, she fell, subsequently had 2 more falls within the next 3 days, she went to urgent care center and to [facility], CT scan of the head done was unremarkable .\par \par Patient also reports that her balance has been off, she has been seen by ENT specialist, has been referred for vestibular therapy, she reports that the therapy is helping her tremendously, she feels she is back to her baseline in regard to her walking .\par \par Patient continues to have anxiety and fatigue, she is taking venlafaxine 37.5 mg daily, tolerating it well,   who is accompanying her reports that her cognition and memory are stable, she has difficulty processing things,\par She is on donepezil 10 mg daily.\par \par Patient continues to take Avonex weekly injections, has had no adverse effects, is tolerating it well.\par \par Patient has followed up with rheumatology, he has positive RNP antibodies, she has been recommended to start Prolia for osteoporosis\par  \par

## 2023-05-05 NOTE — PHYSICAL EXAM
[General Appearance - Alert] : alert [General Appearance - In No Acute Distress] : in no acute distress [Oriented To Time, Place, And Person] : oriented to person, place, and time [Mood] : the mood was normal [Person] : oriented to person [Place] : oriented to place [Time] : oriented to time [Registration Intact] : recent registration memory intact [Naming Objects] : no difficulty naming common objects [Repeating Phrases] : no difficulty repeating a phrase [Fluency] : fluency intact [Comprehension] : comprehension intact [Cranial Nerves Optic (II)] : visual acuity intact bilaterally,  visual fields full to confrontation, pupils equal round and reactive to light [Cranial Nerves Oculomotor (III)] : extraocular motion intact [Cranial Nerves Trigeminal (V)] : facial sensation intact symmetrically [Cranial Nerves Facial (VII)] : face symmetrical [Cranial Nerves Glossopharyngeal (IX)] : tongue and palate midline [Cranial Nerves Accessory (XI - Cranial And Spinal)] : head turning and shoulder shrug symmetric [Cranial Nerves Hypoglossal (XII)] : there was no tongue deviation with protrusion [Whisper Not [place]] : whispered voice was not heard [Motor Tone] : muscle tone was normal in all four extremities [Sensation Tactile Decrease] : light touch was intact [Coordination - Dysmetria Impaired Finger-to-Nose Left Only] : present on the left side [2+] : Ankle jerk left 2+ [PERRL With Normal Accommodation] : pupils were equal in size, round, reactive to light, with normal accommodation [Extraocular Movements] : extraocular movements were intact [No APD] : no afferent pupillary defect [Full Visual Field] : full visual field [] : the neck was supple [Neck Cervical Mass (___cm)] : no neck mass was observed [Short Term Intact] : short term memory impaired [Concentration Intact] : a decrease in concentrating ability was observed [Romberg's Sign] : Romberg's sign was negtive [Dysdiadochokinesia Bilaterally] : not present [Plantar Reflex Right Only] : normal on the right [Plantar Reflex Left Only] : normal on the left [FreeTextEntry6] : No pronator drift, mild weakness in the left knee extensors, other muscle groups 5/5.  [FreeTextEntry8] : Coordination: Mildly ataxic gait

## 2023-05-05 NOTE — REASON FOR VISIT
[Follow-Up: _____] : a [unfilled] follow-up visit [Spouse] : spouse [FreeTextEntry1] : for recent falls and MS

## 2023-05-05 NOTE — REVIEW OF SYSTEMS
[Memory Lapses or Loss] : memory loss [Decr. Concentrating Ability] : decreased concentrating ability [Poor Coordination] : poor coordination [Difficulties in Speech] : speech difficulties [Numbness] : numbness [Dizziness] : dizziness [As Noted in HPI] : as noted in HPI [Anxiety] : anxiety [Incontinence] : incontinence [Frequent Falls] : frequent falls [Negative] : Constitutional [Feeling Tired] : not feeling tired [Tingling] : no tingling

## 2023-05-05 NOTE — DISCUSSION/SUMMARY
[FreeTextEntry1] : 72-year-old F with PMHx of multiple sclerosis, diagnosed in her 40's, has been on  Avonex weekly IM injections, was switched to Tecfidera, for a short time, discontinued as her blood work was abnormal, was under care of neurologist Dr. Sharpe previously.\par \par # Multiple sclerosis possibly relapsing remitting; stable with mild ataxic gait, recent history of 3 falls within a week most likely accidental, however would like to rule out MS flareup/exacerbation\par \par -Obtain MRI brain, patient is refusing contrast, she reports she has had severe reaction with it but cannot recall when and where, her  also does not remember what kind of reaction she had\par - I have recommended continuing Avonex IM injections weekly\par -Continue vitamin D 3000 IU daily\par \par # Dizziness / vertigo; most Likely positional, markable improvement with vestibular therapy \par \par # Mild cognitive impairment; most likely related to MS\par \par - Continue donepezil 10 mg daily\par \par # Fatigue/anxiety -most likely related to MS\par \par - continue Venlafaxine 37.5 QD\par \par # MRI brain  reveals old lacunar infarcts\par \par - continue BASA and low dose atorvastatin

## 2023-05-19 ENCOUNTER — NON-APPOINTMENT (OUTPATIENT)
Age: 73
End: 2023-05-19

## 2023-06-09 ENCOUNTER — LABORATORY RESULT (OUTPATIENT)
Age: 73
End: 2023-06-09

## 2023-06-14 ENCOUNTER — APPOINTMENT (OUTPATIENT)
Dept: RHEUMATOLOGY | Facility: CLINIC | Age: 73
End: 2023-06-14
Payer: MEDICARE

## 2023-06-14 VITALS
HEIGHT: 61 IN | WEIGHT: 107 LBS | HEART RATE: 86 BPM | DIASTOLIC BLOOD PRESSURE: 84 MMHG | TEMPERATURE: 97.3 F | SYSTOLIC BLOOD PRESSURE: 140 MMHG | BODY MASS INDEX: 20.2 KG/M2 | OXYGEN SATURATION: 96 %

## 2023-06-14 PROCEDURE — 99214 OFFICE O/P EST MOD 30 MIN: CPT | Mod: 25

## 2023-06-14 PROCEDURE — 96372 THER/PROPH/DIAG INJ SC/IM: CPT

## 2023-06-14 RX ORDER — DENOSUMAB 60 MG/ML
60 INJECTION SUBCUTANEOUS
Qty: 1 | Refills: 0 | Status: COMPLETED | OUTPATIENT
Start: 2023-06-14

## 2023-06-14 RX ADMIN — DENOSUMAB 0 MG/ML: 60 INJECTION SUBCUTANEOUS at 00:00

## 2023-06-14 NOTE — PHYSICAL EXAM
[General Appearance - Alert] : alert [General Appearance - In No Acute Distress] : in no acute distress [Sclera] : the sclera and conjunctiva were normal [Extraocular Movements] : extraocular movements were intact [Outer Ear] : the ears and nose were normal in appearance [Neck Appearance] : the appearance of the neck was normal [Respiration, Rhythm And Depth] : normal respiratory rhythm and effort [Heart Rate And Rhythm] : heart rate was normal and rhythm regular [Heart Sounds] : normal S1 and S2 [Abdomen Soft] : soft [Abdomen Tenderness] : non-tender [Cervical Lymph Nodes Enlarged Anterior Bilaterally] : anterior cervical [Supraclavicular Lymph Nodes Enlarged Bilaterally] : supraclavicular [No CVA Tenderness] : no ~M costovertebral angle tenderness [Motor Tone] : muscle strength and tone were normal [] : no rash [No Focal Deficits] : no focal deficits [Impaired Insight] : insight and judgment were intact [Mood] : the mood was normal [FreeTextEntry1] : No synovitis or effusion on exam noted today; good ROM in b/l shoulders

## 2023-06-14 NOTE — ASSESSMENT
[FreeTextEntry1] : 72-year-old female, here for follow up w/ hx of MS w/ Neuro; OA hands; w/ +RNP ab. \par She reports she also has Osteoporosis and was told by Rheum, Dr. Justice to take tymlos injections that she did for 3 weeks and had tremors and refuses it and agreeable to Prolia.\par -reviewed labs 6/9/23 w/ BMP w/ high K=5.5 (told to cut out bananas and raisins and repeat K and call for results please and monitor w/ PCP also); normal Ca=9.5; 11/30/22 w/ BMP w/ Ca normal; vit D normal; ESR/CRP normal \par \par Hyperkalemia: high K= 5 on labs 6/9/23\par -told to cut out bananas and raisins and avoid K rich foods\par -repeat K and call for results please and monitor w/ PCP also\par \par +RNP: denies much symptoms of MCTD at this time; and educated on symptoms to monitor for in detail if she evolves to alert us \par -No synovitis or effusion on exam noted today and advised to monitor.\par -knows can see Pulm for PFTs/chest imaging for +RNP to r/o ILD as discussed \par -states her Echo w/ her cardiologist normal without pulmonary hypertension and monitor there\par \par OA hands: noted to have heberden nodes at DIPs with PIP hypertrophy without pain at this time and will monitor.\par \par LBP: intermittent; not much today \par -xray LS spine 2/7/22 DDD; SI normal \par -Advil PRN w/ food needed sparingly helps\par \par Osteoporosis: Dexa 9/1/2021 from [place] [facility] w/ lowest t-sore -3.6 at left femoral neck and normal at AP spineL1-L4.\par -Dexa referral given to do after 9/1/23 to monitor \par -reviewed note from her dentist, Dr. Carlton Lugo 12/9/22 stating she has medical clearance for Prolia\par -Risks and benefits of prolia use were d/w patient including but not limited to myalgias, flu like symptoms, atypical fractures, infections, avascular necrosis of the jaw and hypocalcemia.\par -reviewed labs 6/9/23 w/ BMP w/ normal Ca=9.5\par -today given prolia 60mg/ml lot # [id number] exp 8/31/25 to right upper arm and tolerated well \par -denies any hx of fractures; unsure if FH of osteoporosis; non-smoker \par -she refuses tymlos and defers forteo daily injections for 2yrs\par -states she was on alendronate few yrs ago but forgets to take it so defers it \par -msg sent to her Neuro, Dr. Macias to confirm ok to be on prolia w/ her MS as requested by patient and told should be fine\par -aside from being postmenopausal put in to checked for secondary causes of osteoporosis incld normal PTH, normal vit D; high TSH (told to adjust Synthroid w/ her PCP please)\par -encouraged Ca + vit D supplementation\par -encouraged weight bearing exercises as tolerated.\par \par -educated on symptoms to monitor for in detail and alert us if any concerns.\par -knows to stay up to date on health maintenance w/ PCP\par -f/u in 3 mo w/ Dexa or sooner as needed \par . \par

## 2023-06-14 NOTE — HISTORY OF PRESENT ILLNESS
[FreeTextEntry1] : 72-year-old female here for follow up w/ hx of MS w/ Neuro; OA hands; +RNP ab. \par She reports she also has Osteoporosis and was told by [facility], Dr. Justice to take tymlos injections that she did for 3 weeks and had tremors and refuses it and agreeable to Prolia.\par \par Today she states she wants to get the prolia today. \par She states she did labs to review in detail.\par She is noted on labs to have high potassium and states she does eat bananas and raisins that she will cut down on.\par She has note from her dentist, Dr. Carlton Lugo 12/9/22 stating she has medical clearance for Prolia. \par Patient states she has history of osteoporosis for the past few years. Patient states in the past was treated with alendronate which was stopped because she kept forgetting to take pills.\par States she saw Dr. Cheung at [facility], Dr. Justice and had DEXA September 1, 2021 that was showing worsening of her osteoporosis so she was told to take Tymlos injections daily. Patient states she tried taking Tymlos for about 3 weeks but she kept getting tremors and shaking so she stopped it and refuses it.\par Patient defers Forteo injections at this time as well.\par Patient denies any history of fractures as far.\par Patient states she is unsure if family history of osteoporosis.\par States she was never a smoker.\par States she knows cut down on vitamin D 5000 once a day w/ high vit D level on labs and take calcium 600 b.i.d.\par \par She is noted to have some Heberden nodes of her DIPs and denies any pain in the joints.\par Denies any swelling or redness or warmth of any joints.\par States she notices some lower back pain worse with sitting; better with stretching; not much pain today. Denies any loss of bladder or bowel incontinence or saddle anesthesias.\par States the Raynaud's like symptoms in the feet are resolved now. No ulcers. \par Denies any fever/chills, no rashes, no ulcers, no dry eyes, no dry mouth, no infectious diarrhea or  symptoms at this time.\par Reports hx of TIA & states she takes ASA daily; no blood clots; no miscarriages; no pregnancies.\par Denies any SOB or cough at this time.\par \par

## 2023-06-14 NOTE — PROCEDURE
[Today's Date:] : Date: [unfilled] [Risks] : risks [Benefits] : benefits [Alternatives] : alternatives [Consent Obtained] : written consent was obtained prior to the procedure and is detailed in the patient's record [Patient] : Prior to the start of the procedure a time out was taken and the identity of the patient was confirmed via name and date of birth with the patient. The correct site and the procedure to be performed were confirmed. The correct side was confirmed if applicable. The availability of the correct equipment was verified [Therapeutic] : therapeutic [#1 Site: ______] : #1 site identified in the [unfilled] [Ethyl Chloride] : ethyl chloride [Alcohol] : alcohol [Tolerated Well] : the patient tolerated the procedure well [No Complications] : there were no complications [de-identified] : Prolia 60 mg/ml lot # [id number] exp 8/31/25

## 2023-06-14 NOTE — REASON FOR VISIT
[Follow-Up: _____] : a [unfilled] follow-up visit [FreeTextEntry1] : +RNP: high K; OA; Osteoporosis; review labs/med; get prolia. \par

## 2023-07-01 NOTE — ED PROVIDER NOTE - CLINICAL SUMMARY MEDICAL DECISION MAKING FREE TEXT BOX
1:1 maintained. Pt awake and alert resting in recliner. Environment checked for all ligature risks and safety hazards utilizing environmental safety checklist.
Detail Level: Detailed
Sunscreen Recommendations: Zinc based sunscreen
elderly f with right sided sciatica, motrin, flexeril, lidoderm patch and f/u with her pt who she already sees for her MS. pt given another script for pt referral.  to f/u with pcp at scheduled appt on monday

## 2023-09-13 ENCOUNTER — APPOINTMENT (OUTPATIENT)
Dept: RHEUMATOLOGY | Facility: CLINIC | Age: 73
End: 2023-09-13
Payer: MEDICARE

## 2023-09-13 VITALS
WEIGHT: 103 LBS | SYSTOLIC BLOOD PRESSURE: 130 MMHG | HEIGHT: 61 IN | BODY MASS INDEX: 19.45 KG/M2 | HEART RATE: 82 BPM | OXYGEN SATURATION: 98 % | DIASTOLIC BLOOD PRESSURE: 80 MMHG | TEMPERATURE: 97.2 F

## 2023-09-13 DIAGNOSIS — E87.5 HYPERKALEMIA: ICD-10-CM

## 2023-09-13 PROCEDURE — 99214 OFFICE O/P EST MOD 30 MIN: CPT

## 2023-09-14 LAB
ANION GAP SERPL CALC-SCNC: 10 MMOL/L
BUN SERPL-MCNC: 23 MG/DL
CALCIUM SERPL-MCNC: 9.2 MG/DL
CHLORIDE SERPL-SCNC: 104 MMOL/L
CO2 SERPL-SCNC: 28 MMOL/L
CREAT SERPL-MCNC: 0.94 MG/DL
EGFR: 65 ML/MIN/1.73M2
GLUCOSE SERPL-MCNC: 116 MG/DL
POTASSIUM SERPL-SCNC: 4.8 MMOL/L
SODIUM SERPL-SCNC: 142 MMOL/L

## 2023-10-04 RX ORDER — INTERFERON BETA-1A 30MCG/.5ML
30 KIT INTRAMUSCULAR
Qty: 1 | Refills: 5 | Status: ACTIVE | COMMUNITY
Start: 2020-02-14 | End: 1900-01-01

## 2023-10-17 ENCOUNTER — APPOINTMENT (OUTPATIENT)
Dept: NEUROLOGY | Facility: CLINIC | Age: 73
End: 2023-10-17
Payer: MEDICARE

## 2023-10-17 VITALS
HEIGHT: 61 IN | HEART RATE: 75 BPM | TEMPERATURE: 97.6 F | DIASTOLIC BLOOD PRESSURE: 78 MMHG | SYSTOLIC BLOOD PRESSURE: 131 MMHG | WEIGHT: 104 LBS | BODY MASS INDEX: 19.63 KG/M2

## 2023-10-17 DIAGNOSIS — G35 MULTIPLE SCLEROSIS: ICD-10-CM

## 2023-10-17 DIAGNOSIS — F09 UNSPECIFIED MENTAL DISORDER DUE TO KNOWN PHYSIOLOGICAL CONDITION: ICD-10-CM

## 2023-10-17 DIAGNOSIS — R53.83 OTHER FATIGUE: ICD-10-CM

## 2023-10-17 DIAGNOSIS — F41.8 OTHER SPECIFIED ANXIETY DISORDERS: ICD-10-CM

## 2023-10-17 PROCEDURE — 99214 OFFICE O/P EST MOD 30 MIN: CPT

## 2023-12-14 ENCOUNTER — APPOINTMENT (OUTPATIENT)
Dept: RHEUMATOLOGY | Facility: CLINIC | Age: 73
End: 2023-12-14

## 2023-12-18 ENCOUNTER — APPOINTMENT (OUTPATIENT)
Dept: RHEUMATOLOGY | Facility: CLINIC | Age: 73
End: 2023-12-18
Payer: MEDICARE

## 2023-12-18 VITALS
HEIGHT: 61 IN | HEART RATE: 89 BPM | OXYGEN SATURATION: 97 % | TEMPERATURE: 97.3 F | DIASTOLIC BLOOD PRESSURE: 80 MMHG | BODY MASS INDEX: 19.45 KG/M2 | WEIGHT: 103 LBS | SYSTOLIC BLOOD PRESSURE: 138 MMHG

## 2023-12-18 PROCEDURE — 96372 THER/PROPH/DIAG INJ SC/IM: CPT

## 2023-12-18 PROCEDURE — 99214 OFFICE O/P EST MOD 30 MIN: CPT | Mod: 25

## 2023-12-18 RX ORDER — DENOSUMAB 60 MG/ML
60 INJECTION SUBCUTANEOUS
Qty: 1 | Refills: 0 | Status: COMPLETED | OUTPATIENT
Start: 2023-12-18

## 2023-12-18 RX ADMIN — DENOSUMAB 0 MG/ML: 60 INJECTION SUBCUTANEOUS at 00:00

## 2023-12-18 NOTE — ASSESSMENT
[FreeTextEntry1] : 73-year-old female, here for follow up w/ hx of MS w/ Neuro; OA hands; w/ +RNP ab. She reports she also has Osteoporosis and was told by Rheum, Dr. Justice to take tymlos injections that she did for 3 weeks and had tremors and refuses it and agreeable to Prolia. -reviewed labs 9/13/23 w/ BMP normal w/ normal K=4.8 and normal Ca=9.2; 6/9/23 w/ BMP w/ high K=5.5 (told to cut out bananas and raisins and repeat K and monitor w/ PCP also); normal Ca=9.5; 11/30/22 w/ BMP w/ Ca normal; vit D normal; ESR/CRP normal  +RNP: denies much symptoms of MCTD at this time; and educated on symptoms to monitor for in detail if she evolves to alert us -No synovitis or effusion on exam noted today and advised to monitor. -knows can see Pulm for PFTs/chest imaging for +RNP to r/o ILD as discussed -states her Echo w/ her cardiologist, Dr. Ibarra normal without pulmonary hypertension and monitor there   OA hands: noted to have heberden nodes at DIPs with PIP hypertrophy without pain at this time and will monitor.  LBP: intermittent; not much today -xray LS spine 2/7/22 DDD; SI normal -Advil PRN w/ food needed sparingly helps  Osteoporosis: reviewed Dexa 9/8/23 w/ Improved lowest t-sore -3.2 (from -3.6) at left femoral neck and has high Frax of hip fracture=7.3% (> 3%)  -will continue Prolia w/ Dexa improved on it but still has Osteoporosis so treat it with Prolia -reviewed note from her dentist, Dr. Carlton Lugo 12/9/22 stating she has medical clearance for Prolia -had Prolia 60mg/ml 6/14/23 q 6 mo -reviewed labs 9/13/23 w/ BMP w/ normal Ca=9.2 -Risks and benefits of prolia use were d/w patient including but not limited to myalgias, flu like symptoms, atypical fractures, infections, avascular necrosis of the jaw and hypocalcemia. -today given Prolia 60mg/ml lot # 60mg/ml lot # [id number] exp 5/31/26 to left upper arm and tolerated well  -labs as below to monitor  -denies any hx of fractures; unsure if FH of osteoporosis; non-smoker -she refuses tymlos and defers forteo daily injections for 2yrs -states she was on alendronate few yrs ago but forgets to take it so defers it -msg sent to her Neuro, Dr. Macias to confirm ok to be on prolia w/ her MS as requested by patient and told should be fine -aside from being postmenopausal put in to checked for secondary causes of osteoporosis incld normal PTH, normal vit D; high TSH (told to adjust Synthroid w/ her PCP please) -encouraged Ca + vit D supplementation -encouraged weight bearing exercises as tolerated.  -educated on symptoms to monitor for in detail and alert us if any concerns. -knows to stay up to date on health maintenance w/ PCP -f/u in 6 mo w/ labs and prolia or sooner as needed.

## 2023-12-18 NOTE — REASON FOR VISIT
[Follow-Up: _____] : a [unfilled] follow-up visit [FreeTextEntry1] : +RNP; OA; Osteoporosis; review labs/Dexa/med; get Prolia

## 2023-12-18 NOTE — PROCEDURE
[Today's Date:] : Date: [unfilled] [Risks] : risks [Benefits] : benefits [Alternatives] : alternatives [Consent Obtained] : written consent was obtained prior to the procedure and is detailed in the patient's record [Patient] : Prior to the start of the procedure a time out was taken and the identity of the patient was confirmed via name and date of birth with the patient. The correct site and the procedure to be performed were confirmed. The correct side was confirmed if applicable. The availability of the correct equipment was verified [Therapeutic] : therapeutic [#1 Site: ______] : #1 site identified in the [unfilled] [Ethyl Chloride] : ethyl chloride [Alcohol] : alcohol [Tolerated Well] : the patient tolerated the procedure well [No Complications] : there were no complications [de-identified] : Prolia 60mg/ml lot # [id number] exp 5/31/26

## 2023-12-18 NOTE — PHYSICAL EXAM
[General Appearance - Alert] : alert [General Appearance - In No Acute Distress] : in no acute distress [Sclera] : the sclera and conjunctiva were normal [Extraocular Movements] : extraocular movements were intact [Outer Ear] : the ears and nose were normal in appearance [Neck Appearance] : the appearance of the neck was normal [Respiration, Rhythm And Depth] : normal respiratory rhythm and effort [Exaggerated Use Of Accessory Muscles For Inspiration] : no accessory muscle use [Heart Rate And Rhythm] : heart rate was normal and rhythm regular [Heart Sounds] : normal S1 and S2 [Abdomen Soft] : soft [Abdomen Tenderness] : non-tender [Cervical Lymph Nodes Enlarged Anterior Bilaterally] : anterior cervical [Supraclavicular Lymph Nodes Enlarged Bilaterally] : supraclavicular [No CVA Tenderness] : no ~M costovertebral angle tenderness [Motor Tone] : muscle strength and tone were normal [] : no rash [No Focal Deficits] : no focal deficits [Impaired Insight] : insight and judgment were intact [Mood] : the mood was normal [FreeTextEntry1] : No synovitis or effusion on exam noted today; Good ROM in b/l shoulders, no pelvic/girdle stiffness and able to stand up without using her hands

## 2023-12-18 NOTE — HISTORY OF PRESENT ILLNESS
[FreeTextEntry1] : 73-year-old female here for follow up w/ hx of MS w/ Neuro; OA hands; +RNP ab. She reports she also has Osteoporosis and was told by [facility], Dr. Justice to take tymlos injections that she did for 3 weeks and had tremors and refuses it and agreeable to Prolia. Today she states she did Dexa 9/8/23 at [facility] w/ Improved lowest t-sore -3.2 (from -3.6) at left femoral neck and has high Frax of hip fracture=7.3% (> 3%). She had Prolia 6/14/23 q 6 mo and here for it today. She was noted on labs 6/9/23 to have high potassium=5.5 and states she does eat bananas and raisins to cut down on & repeat K normal on labs 9/13/23. She had note from her dentist, Dr. Carlton Lugo 12/9/22 stating she has medical clearance for Prolia. Patient states she has history of osteoporosis for the past few years. Patient states in the past was treated with alendronate which was stopped because she kept forgetting to take pills. States she saw Dr. Cheung at [facility], Dr. Justice and had DEXA September 1, 2021 that was showing worsening of her osteoporosis so she was told to take Tymlos injections daily. Patient states she tried taking Tymlos for about 3 weeks but she kept getting tremors and shaking so she stopped it and refuses it. Patient defers Forteo injections at this time as well. Patient denies any history of fractures as far. Patient states she is unsure if family history of osteoporosis. States she was never a smoker. States she knows cut down on vitamin D 5000 once a day w/ high vit D level on labs and take calcium 600 b.i.d.  She is noted to have some Heberden nodes of her DIPs and denies any pain in the joints. Denies any swelling or redness or warmth of any joints. States she notices some lower back pain worse with sitting; better with stretching; not much pain today. Denies any loss of bladder or bowel incontinence or saddle anesthesias. States the Raynaud's like symptoms in the feet are resolved now. No ulcers. Denies any fever/chills, no rashes, no ulcers, no dry eyes, no dry mouth, no infectious diarrhea or  symptoms at this time. Reports hx of TIA & states she takes ASA daily; no blood clots; no miscarriages; no pregnancies. Denies any SOB or cough at this time.

## 2024-03-19 RX ORDER — INTERFERON BETA-1A 30MCG/.5ML
30 KIT INTRAMUSCULAR
Qty: 1 | Refills: 5 | Status: ACTIVE | COMMUNITY
Start: 1900-01-01 | End: 1900-01-01

## 2024-06-13 LAB
ANION GAP SERPL CALC-SCNC: 12 MMOL/L
BUN SERPL-MCNC: 25 MG/DL
CALCIUM SERPL-MCNC: 9.8 MG/DL
CHLORIDE SERPL-SCNC: 102 MMOL/L
CO2 SERPL-SCNC: 28 MMOL/L
CREAT SERPL-MCNC: 0.96 MG/DL
EGFR: 63 ML/MIN/1.73M2
GLUCOSE SERPL-MCNC: 119 MG/DL
POTASSIUM SERPL-SCNC: 4.8 MMOL/L
SODIUM SERPL-SCNC: 142 MMOL/L

## 2024-06-20 ENCOUNTER — APPOINTMENT (OUTPATIENT)
Dept: RHEUMATOLOGY | Facility: CLINIC | Age: 74
End: 2024-06-20
Payer: COMMERCIAL

## 2024-06-20 VITALS
BODY MASS INDEX: 19.63 KG/M2 | OXYGEN SATURATION: 99 % | DIASTOLIC BLOOD PRESSURE: 80 MMHG | WEIGHT: 104 LBS | SYSTOLIC BLOOD PRESSURE: 160 MMHG | HEIGHT: 61 IN | HEART RATE: 72 BPM | TEMPERATURE: 97.5 F

## 2024-06-20 DIAGNOSIS — R76.8 OTHER SPECIFIED ABNORMAL IMMUNOLOGICAL FINDINGS IN SERUM: ICD-10-CM

## 2024-06-20 DIAGNOSIS — M81.0 AGE-RELATED OSTEOPOROSIS W/OUT CURRENT PATHOLOGICAL FRACTURE: ICD-10-CM

## 2024-06-20 DIAGNOSIS — M47.816 SPONDYLOSIS W/OUT MYELOPATHY OR RADICULOPATHY, LUMBAR REGION: ICD-10-CM

## 2024-06-20 DIAGNOSIS — M19.041 PRIMARY OSTEOARTHRITIS, RIGHT HAND: ICD-10-CM

## 2024-06-20 DIAGNOSIS — M19.042 PRIMARY OSTEOARTHRITIS, RIGHT HAND: ICD-10-CM

## 2024-06-20 PROCEDURE — 99214 OFFICE O/P EST MOD 30 MIN: CPT | Mod: 25

## 2024-06-20 PROCEDURE — 96401 CHEMO ANTI-NEOPL SQ/IM: CPT

## 2024-06-20 RX ORDER — DENOSUMAB 60 MG/ML
60 INJECTION SUBCUTANEOUS
Qty: 1 | Refills: 1 | Status: ACTIVE | COMMUNITY
Start: 2022-01-27

## 2024-06-20 RX ORDER — DENOSUMAB 60 MG/ML
60 INJECTION SUBCUTANEOUS
Qty: 1 | Refills: 0 | Status: COMPLETED | OUTPATIENT
Start: 2024-06-20

## 2024-06-20 RX ADMIN — DENOSUMAB 0 MG/ML: 60 INJECTION SUBCUTANEOUS at 00:00

## 2024-06-20 NOTE — ASSESSMENT
[FreeTextEntry1] : 73-year-old female, here for follow up w/ hx of MS w/ Neuro; OA hands; w/ +RNP ab. She reports she also has Osteoporosis and was told by Rheum, Dr. Justice to take tymlos injections that she did for 3 weeks and had tremors and refuses it and agreeable to Prolia. -reviewed labs 6/13/24 w/ BMP normal w/ normal Creat and normal Ca=9.8; 11/30/22 vit D normal; ESR/CRP normal  +RNP: denies much symptoms of MCTD at this time; and educated on symptoms to monitor for in detail if she evolves to alert us -No synovitis or effusion on exam noted today and advised to monitor. -knows can see Pulm for PFTs/chest imaging for +RNP to r/o ILD as discussed -states her Echo w/ her cardiologist, Dr. Ibarra normal without pulmonary hypertension and monitor there  OA hands: noted to have heberden nodes at DIPs with PIP hypertrophy without pain at this time and will monitor.  LBP: intermittent; not much today -xray LS spine 2/7/22 DDD; SI normal -Advil PRN w/ food needed sparingly helps  Osteoporosis: reviewed Dexa 9/8/23 w/ Improved lowest t-sore -3.2 (from -3.6) at left femoral neck and has high Frax of hip fracture=7.3% (> 3%) -will continue Prolia w/ Dexa improved on it but still has Osteoporosis so treat it with Prolia -reviewed note from her dentist, Dr. Carlton Lugo 12/9/22 stating she has medical clearance for Prolia -had Prolia 60mg/ml 12/18/23 q 6 mo -reviewed labs 6/13/24 w/ BMP w/ normal Ca=9.8 -Risks and benefits of prolia use were d/w patient including but not limited to myalgias, flu like symptoms, atypical fractures, infections, avascular necrosis of the jaw and hypocalcemia. -today given Prolia 60mg/ml lot # [id number] exp 10/31/26 to right upper arm and tolerated well -labs as below to monitor -denies any hx of fractures; unsure if FH of osteoporosis; non-smoker -she refuses tymlos and defers forteo daily injections for 2yrs -states she was on alendronate few yrs ago but forgets to take it so defers it -msg sent to her Neuro, Dr. Macias to confirm ok to be on prolia w/ her MS as requested by patient and told should be fine -aside from being postmenopausal put in to checked for secondary causes of osteoporosis incld normal PTH, normal vit D; high TSH (told to adjust Synthroid w/ her PCP please) -encouraged Ca + vit D supplementation -encouraged weight bearing exercises as tolerated.  -educated on symptoms to monitor for in detail and alert us if any concerns. -knows to stay up to date on health maintenance w/ PCP -f/u in 6 mo w/ labs and prolia or sooner as needed.

## 2024-06-20 NOTE — PROCEDURE
[Today's Date:] : Date: [unfilled] [Risks] : risks [Benefits] : benefits [Alternatives] : alternatives [Consent Obtained] : written consent was obtained prior to the procedure and is detailed in the patient's record [Patient] : Prior to the start of the procedure a time out was taken and the identity of the patient was confirmed via name and date of birth with the patient. The correct site and the procedure to be performed were confirmed. The correct side was confirmed if applicable. The availability of the correct equipment was verified [Therapeutic] : therapeutic [#1 Site: ______] : #1 site identified in the [unfilled] [Ethyl Chloride] : ethyl chloride [Alcohol] : alcohol [Tolerated Well] : the patient tolerated the procedure well [No Complications] : there were no complications [de-identified] : Prolia 60mg/ml lot # [id number] exp 10/31/26

## 2024-06-20 NOTE — REASON FOR VISIT
[Follow-Up: _____] : a [unfilled] follow-up visit [FreeTextEntry1] : +RNP; OA; Osteoporosis; review labs/med; get Prolia.

## 2024-06-20 NOTE — HISTORY OF PRESENT ILLNESS
[FreeTextEntry1] : 73-year-old female here for follow up w/ hx of MS w/ Neuro; OA hands; +RNP ab. She reports she also has Osteoporosis and was told by Dr. Vinh Cheung to take tymlos injections that she did for 3 weeks and had tremors and refuses it and agreeable to Prolia. Today she states she did Dexa 9/8/23 at [facility] w/ Improved lowest t-sore -3.2 (from -3.6) at left femoral neck and has high Frax of hip fracture=7.3% (> 3%). She had Prolia 12/18/23 q 6 mo and here for it today. She did lab to review in detail. She had note from her dentist, Dr. Carlton Lugo 12/9/22 stating she has medical clearance for Prolia. Patient states she has history of osteoporosis for the past few years. Patient states in the past was treated with alendronate which was stopped because she kept forgetting to take pills. States she saw Dr. Cheung at [facility], Dr. Justice and had DEXA September 1, 2021 that was showing worsening of her osteoporosis so she was told to take Tymlos injections daily. Patient states she tried taking Tymlos for about 3 weeks but she kept getting tremors and shaking so she stopped it and refuses it. Patient defers Forteo injections at this time as well. Patient denies any history of fractures as far. Patient states she is unsure if family history of osteoporosis. States she was never a smoker. States she knows cut down on vitamin D 5000 once a day w/ high vit D level on labs and take calcium 600 b.i.d.  She is noted to have some Heberden nodes of her DIPs and denies any pain in the joints. Denies any swelling or redness or warmth of any joints. States she notices some lower back pain worse with sitting; better with stretching; not much pain today. Denies any loss of bladder or bowel incontinence or saddle anesthesias. States the Raynaud's like symptoms in the feet are resolved now. No ulcers. Denies any fever/chills, no rashes, no ulcers, no dry eyes, no dry mouth, no infectious diarrhea or  symptoms at this time. Reports hx of TIA & states she takes ASA daily; no blood clots; no miscarriages; no pregnancies. Denies any SOB or cough at this time.

## 2024-08-14 ENCOUNTER — APPOINTMENT (OUTPATIENT)
Dept: NEUROLOGY | Facility: CLINIC | Age: 74
End: 2024-08-14
Payer: MEDICARE

## 2024-08-14 VITALS
HEART RATE: 70 BPM | BODY MASS INDEX: 19.45 KG/M2 | DIASTOLIC BLOOD PRESSURE: 80 MMHG | SYSTOLIC BLOOD PRESSURE: 160 MMHG | TEMPERATURE: 98.2 F | WEIGHT: 103 LBS | HEIGHT: 61 IN

## 2024-08-14 DIAGNOSIS — G35 MULTIPLE SCLEROSIS: ICD-10-CM

## 2024-08-14 DIAGNOSIS — R42 DIZZINESS AND GIDDINESS: ICD-10-CM

## 2024-08-14 DIAGNOSIS — R45.89 OTHER SYMPTOMS AND SIGNS INVOLVING EMOTIONAL STATE: ICD-10-CM

## 2024-08-14 PROCEDURE — 99214 OFFICE O/P EST MOD 30 MIN: CPT

## 2024-08-14 RX ORDER — SERTRALINE 25 MG/1
25 TABLET, FILM COATED ORAL
Qty: 30 | Refills: 2 | Status: ACTIVE | COMMUNITY
Start: 2024-08-14 | End: 1900-01-01

## 2024-08-14 NOTE — HISTORY OF PRESENT ILLNESS
[FreeTextEntry1] : Patient  is here for follow-up visit, last seen on 10/17/23. Patient has not had any falls, she walks very cautiously, uses the walker, generally she feels well.  Patient continues to have anxiety and feels depressed,  reports that every now and then when she is by herself she starts crying, at home when she is alone she is totally dependent on him, when she is in the supermarket she is very independent and talks to everybody, he has not noted any decline in her cognition.  She is on donepezil 10 Mg daily, in addition she takes venlafaxine 37.5 Mg half tablet daily at night  Patient continues to take Avonex weekly injections, has had no adverse effects, is tolerating it well.

## 2024-08-14 NOTE — PHYSICAL EXAM
[General Appearance - Alert] : alert [General Appearance - In No Acute Distress] : in no acute distress [Oriented To Time, Place, And Person] : oriented to person, place, and time [Mood] : the mood was normal [Person] : oriented to person [Place] : oriented to place [Time] : oriented to time [Registration Intact] : recent registration memory intact [Naming Objects] : no difficulty naming common objects [Repeating Phrases] : no difficulty repeating a phrase [Fluency] : fluency intact [Comprehension] : comprehension intact [Cranial Nerves Optic (II)] : visual acuity intact bilaterally,  visual fields full to confrontation, pupils equal round and reactive to light [Cranial Nerves Oculomotor (III)] : extraocular motion intact [Cranial Nerves Trigeminal (V)] : facial sensation intact symmetrically [Cranial Nerves Facial (VII)] : face symmetrical [Cranial Nerves Glossopharyngeal (IX)] : tongue and palate midline [Cranial Nerves Accessory (XI - Cranial And Spinal)] : head turning and shoulder shrug symmetric [Cranial Nerves Hypoglossal (XII)] : there was no tongue deviation with protrusion [Whisper Not [place]] : whispered voice was not heard [Motor Tone] : muscle tone was normal in all four extremities [Sensation Tactile Decrease] : light touch was intact [Coordination - Dysmetria Impaired Finger-to-Nose Left Only] : present on the left side [2+] : Ankle jerk left 2+ [PERRL With Normal Accommodation] : pupils were equal in size, round, reactive to light, with normal accommodation [Extraocular Movements] : extraocular movements were intact [No APD] : no afferent pupillary defect [Full Visual Field] : full visual field [] : the neck was supple [Neck Cervical Mass (___cm)] : no neck mass was observed [Short Term Intact] : short term memory impaired [Concentration Intact] : a decrease in concentrating ability was observed [Romberg's Sign] : Romberg's sign was negtive [Dysdiadochokinesia Bilaterally] : not present [Plantar Reflex Right Only] : normal on the right [Plantar Reflex Left Only] : normal on the left [FreeTextEntry6] : No pronator drift, mild weakness in the left knee extensors, other muscle groups 5/5.  [FreeTextEntry8] : Coordination: Mildly ataxic gait

## 2024-08-14 NOTE — DISCUSSION/SUMMARY
[FreeTextEntry1] : 73-year-old F with PMHx of multiple sclerosis, diagnosed in her 40's, has been on  Avonex weekly IM injections, was switched to Tecfidera, for a short time, discontinued as her blood work was abnormal, was under care of neurologist Dr. Sharpe previously.  # Multiple sclerosis possibly relapsing remitting; stable with mild ataxic gait, and falls; MRI brain revealed no new lesions or change compared with 6/2021. May Consider weaning of Avonex  - I have recommended continuing Avonex IM injections weekly - Continue vitamin D 3000 IU daily  # Dizziness / vertigo; most Likely positional, improved with vestibular therapy   # Mild cognitive impairment; most likely related to MS  - Continue donepezil 10 mg daily  # Depressed mood -Fatigue/anxiety   - Start Sertaline 25 mg daily, plan to increase dose to 50 mg in 3 months - discontinue Venlafaxine 1/2 tab QD -Recommended going to senior center at least once a week  # MRI brain reveals old lacunar infarcts  - continue BASA and low dose atorvastatin

## 2024-08-14 NOTE — REVIEW OF SYSTEMS
[Memory Lapses or Loss] : memory loss [Decr. Concentrating Ability] : decreased concentrating ability [Poor Coordination] : poor coordination [Difficulties in Speech] : speech difficulties [Numbness] : numbness [Dizziness] : dizziness [As Noted in HPI] : as noted in HPI [Anxiety] : anxiety [Incontinence] : incontinence [Negative] : Heme/Lymph [Feeling Tired] : not feeling tired [Tingling] : no tingling [Frequent Falls] : not falling [Depression] : depression [de-identified] : tearful

## 2024-08-14 NOTE — DATA REVIEWED
[No studies available for review at this time.] : No studies available for review at this time. [de-identified] : 5/16/23: MR brain, extensive bilateral leukoencephalopathy with associated senescent brain changes, no change or new lesions from prior study 6/2021 6/7/21: MRI brain reveals diffuse leukoencephalopathy consistent with advanced MS.. There is focal abnormal signal in the left putamen suspicious for a remote lacunar type infarct, unchanged since the prior exam of 9/12/2017. Two more small foci one in anika and other in right cerebellar hemispheric consistent with remote blood products from CVA's, new since the most recent prior MRI. No acute infarcts.   [de-identified] : 3/18/21: vitamin D 160, B12 489, CBC and LFT's are normal\par

## 2024-08-14 NOTE — REASON FOR VISIT
[Follow-Up: _____] : a [unfilled] follow-up visit [Spouse] : spouse [FreeTextEntry1] : for MS, depressed mood

## 2024-08-15 ENCOUNTER — NON-APPOINTMENT (OUTPATIENT)
Age: 74
End: 2024-08-15

## 2024-10-04 RX ORDER — VENLAFAXINE 37.5 MG/1
37.5 TABLET ORAL DAILY
Qty: 90 | Refills: 0 | Status: ACTIVE | COMMUNITY
Start: 2024-10-04 | End: 1900-01-01

## 2024-10-13 NOTE — ED ADULT NURSE NOTE - NS ED NOTE ABUSE SUSPICION NEGLECT YN
Care Due:                  Date            Visit Type   Department     Provider  --------------------------------------------------------------------------------                                             [facility]                                           MEDICINE/INTERN  Last Visit: 12-      JOLYNN          [facility]         Sylvain CRAWFORD      [facility]/  MEDICINE/INTERN  Next Visit: 12-      FICE VISIT   [facility]         Sylvain Asher                                                            Last  Test          Frequency    Reason                     Performed    Due Date  --------------------------------------------------------------------------------    CMP.........  12 months..  losartan-hydrochlorothiaz  12- 12-                             sommer......................    HBA1C.......  6 months...  tirzepatide,.............  12- 06-    [facility] Embedded Care Due Messages. Reference number: [id number].   10/12/2024 9:14:16 AM CDT  
Refill Routing Note   Medication(s) are not appropriate for processing by [facility] for the following reason(s):        Required labs outdated-TSH    [facility] action(s):  Defer     Requires labs : Yes -CMP/A1c            Appointments  past 12m or future 3m with PCP    Date Provider   Last Visit   12/1/2023 Sylvain Asher MD   Next Visit   12/12/2024 Sylvain Asher MD   ED visits in past 90 days: 0        Note composed:7:37 AM 10/13/2024                
No

## 2025-01-06 ENCOUNTER — APPOINTMENT (OUTPATIENT)
Dept: RHEUMATOLOGY | Facility: CLINIC | Age: 75
End: 2025-01-06
Payer: MEDICARE

## 2025-01-06 VITALS
BODY MASS INDEX: 18.5 KG/M2 | OXYGEN SATURATION: 98 % | HEIGHT: 61 IN | SYSTOLIC BLOOD PRESSURE: 120 MMHG | TEMPERATURE: 97.3 F | DIASTOLIC BLOOD PRESSURE: 70 MMHG | HEART RATE: 74 BPM | WEIGHT: 98 LBS

## 2025-01-06 DIAGNOSIS — R76.8 OTHER SPECIFIED ABNORMAL IMMUNOLOGICAL FINDINGS IN SERUM: ICD-10-CM

## 2025-01-06 DIAGNOSIS — M19.041 PRIMARY OSTEOARTHRITIS, RIGHT HAND: ICD-10-CM

## 2025-01-06 DIAGNOSIS — M81.0 AGE-RELATED OSTEOPOROSIS W/OUT CURRENT PATHOLOGICAL FRACTURE: ICD-10-CM

## 2025-01-06 DIAGNOSIS — M19.042 PRIMARY OSTEOARTHRITIS, RIGHT HAND: ICD-10-CM

## 2025-01-06 DIAGNOSIS — M47.816 SPONDYLOSIS W/OUT MYELOPATHY OR RADICULOPATHY, LUMBAR REGION: ICD-10-CM

## 2025-01-06 PROCEDURE — 96372 THER/PROPH/DIAG INJ SC/IM: CPT

## 2025-01-06 PROCEDURE — G2211 COMPLEX E/M VISIT ADD ON: CPT

## 2025-01-06 PROCEDURE — 99214 OFFICE O/P EST MOD 30 MIN: CPT | Mod: 25

## 2025-01-06 RX ORDER — DENOSUMAB 60 MG/ML
60 INJECTION SUBCUTANEOUS
Qty: 1 | Refills: 0 | Status: COMPLETED | OUTPATIENT
Start: 2025-01-06

## 2025-01-06 RX ADMIN — DENOSUMAB 0 MG/ML: 60 INJECTION SUBCUTANEOUS at 00:00

## 2025-03-17 ENCOUNTER — APPOINTMENT (OUTPATIENT)
Dept: NEUROLOGY | Facility: CLINIC | Age: 75
End: 2025-03-17
Payer: MEDICARE

## 2025-03-17 DIAGNOSIS — R26.0 ATAXIC GAIT: ICD-10-CM

## 2025-03-17 DIAGNOSIS — G35 MULTIPLE SCLEROSIS: ICD-10-CM

## 2025-03-17 DIAGNOSIS — F09 UNSPECIFIED MENTAL DISORDER DUE TO KNOWN PHYSIOLOGICAL CONDITION: ICD-10-CM

## 2025-03-17 DIAGNOSIS — R45.89 OTHER SYMPTOMS AND SIGNS INVOLVING EMOTIONAL STATE: ICD-10-CM

## 2025-03-17 PROCEDURE — G2211 COMPLEX E/M VISIT ADD ON: CPT

## 2025-03-17 PROCEDURE — 99214 OFFICE O/P EST MOD 30 MIN: CPT

## 2025-03-18 ENCOUNTER — NON-APPOINTMENT (OUTPATIENT)
Age: 75
End: 2025-03-18

## 2025-03-27 ENCOUNTER — NON-APPOINTMENT (OUTPATIENT)
Age: 75
End: 2025-03-27

## 2025-06-02 ENCOUNTER — LABORATORY RESULT (OUTPATIENT)
Age: 75
End: 2025-06-02

## 2025-06-25 ENCOUNTER — APPOINTMENT (OUTPATIENT)
Dept: RHEUMATOLOGY | Facility: CLINIC | Age: 75
End: 2025-06-25

## 2025-07-09 ENCOUNTER — APPOINTMENT (OUTPATIENT)
Dept: NEUROLOGY | Facility: CLINIC | Age: 75
End: 2025-07-09
Payer: MEDICARE

## 2025-07-09 VITALS
DIASTOLIC BLOOD PRESSURE: 78 MMHG | WEIGHT: 104 LBS | HEIGHT: 61 IN | HEART RATE: 80 BPM | SYSTOLIC BLOOD PRESSURE: 131 MMHG | RESPIRATION RATE: 16 BRPM | BODY MASS INDEX: 19.63 KG/M2

## 2025-07-09 PROCEDURE — 99214 OFFICE O/P EST MOD 30 MIN: CPT

## 2025-07-28 ENCOUNTER — APPOINTMENT (OUTPATIENT)
Dept: RHEUMATOLOGY | Facility: CLINIC | Age: 75
End: 2025-07-28
Payer: MEDICARE

## 2025-07-28 VITALS
BODY MASS INDEX: 19.07 KG/M2 | SYSTOLIC BLOOD PRESSURE: 110 MMHG | TEMPERATURE: 97.6 F | OXYGEN SATURATION: 97 % | HEART RATE: 68 BPM | HEIGHT: 61 IN | WEIGHT: 101 LBS | DIASTOLIC BLOOD PRESSURE: 70 MMHG

## 2025-07-28 DIAGNOSIS — M47.816 SPONDYLOSIS W/OUT MYELOPATHY OR RADICULOPATHY, LUMBAR REGION: ICD-10-CM

## 2025-07-28 DIAGNOSIS — R76.8 OTHER SPECIFIED ABNORMAL IMMUNOLOGICAL FINDINGS IN SERUM: ICD-10-CM

## 2025-07-28 DIAGNOSIS — M19.041 PRIMARY OSTEOARTHRITIS, RIGHT HAND: ICD-10-CM

## 2025-07-28 DIAGNOSIS — M19.042 PRIMARY OSTEOARTHRITIS, RIGHT HAND: ICD-10-CM

## 2025-07-28 DIAGNOSIS — M81.0 AGE-RELATED OSTEOPOROSIS W/OUT CURRENT PATHOLOGICAL FRACTURE: ICD-10-CM

## 2025-07-28 PROCEDURE — 99214 OFFICE O/P EST MOD 30 MIN: CPT | Mod: 25

## 2025-07-28 PROCEDURE — 96372 THER/PROPH/DIAG INJ SC/IM: CPT

## 2025-07-28 RX ORDER — DENOSUMAB 60 MG/ML
60 INJECTION SUBCUTANEOUS
Qty: 1 | Refills: 0 | Status: COMPLETED | OUTPATIENT
Start: 2025-07-28

## 2025-07-28 RX ADMIN — DENOSUMAB 0 MG/ML: 60 INJECTION SUBCUTANEOUS at 00:00

## 2025-08-11 ENCOUNTER — APPOINTMENT (OUTPATIENT)
Dept: RADIOLOGY | Facility: CLINIC | Age: 75
End: 2025-08-11
Payer: MEDICARE

## 2025-08-11 ENCOUNTER — OUTPATIENT (OUTPATIENT)
Dept: OUTPATIENT SERVICES | Facility: HOSPITAL | Age: 75
LOS: 1 days | End: 2025-08-11
Payer: MEDICARE

## 2025-08-11 DIAGNOSIS — M81.0 AGE-RELATED OSTEOPOROSIS WITHOUT CURRENT PATHOLOGICAL FRACTURE: ICD-10-CM

## 2025-08-11 PROCEDURE — 77080 DXA BONE DENSITY AXIAL: CPT

## 2025-08-11 PROCEDURE — 77080 DXA BONE DENSITY AXIAL: CPT | Mod: 26
